# Patient Record
Sex: MALE | Race: WHITE | NOT HISPANIC OR LATINO | Employment: UNEMPLOYED | URBAN - METROPOLITAN AREA
[De-identification: names, ages, dates, MRNs, and addresses within clinical notes are randomized per-mention and may not be internally consistent; named-entity substitution may affect disease eponyms.]

---

## 2018-11-19 ENCOUNTER — HOSPITAL ENCOUNTER (EMERGENCY)
Facility: HOSPITAL | Age: 23
Discharge: HOME | End: 2018-11-19
Attending: EMERGENCY MEDICINE

## 2018-11-19 VITALS
RESPIRATION RATE: 18 BRPM | HEIGHT: 70 IN | TEMPERATURE: 98 F | HEART RATE: 90 BPM | OXYGEN SATURATION: 98 % | SYSTOLIC BLOOD PRESSURE: 148 MMHG | DIASTOLIC BLOOD PRESSURE: 74 MMHG | WEIGHT: 225.5 LBS | BODY MASS INDEX: 32.28 KG/M2

## 2018-11-19 DIAGNOSIS — S06.0X0A CONCUSSION WITHOUT LOSS OF CONSCIOUSNESS, INITIAL ENCOUNTER: Primary | ICD-10-CM

## 2018-11-19 DIAGNOSIS — G44.319 ACUTE POST-TRAUMATIC HEADACHE, NOT INTRACTABLE: ICD-10-CM

## 2018-11-19 PROCEDURE — 99284 EMERGENCY DEPT VISIT MOD MDM: CPT

## 2018-11-19 PROCEDURE — 25000003 PHARM REV CODE 250: Performed by: PHYSICIAN ASSISTANT

## 2018-11-19 RX ORDER — ACETAMINOPHEN 325 MG/1
650 TABLET ORAL
Status: COMPLETED | OUTPATIENT
Start: 2018-11-19 | End: 2018-11-19

## 2018-11-19 RX ADMIN — ACETAMINOPHEN 650 MG: 325 TABLET ORAL at 09:11

## 2018-11-20 NOTE — ED PROVIDER NOTES
"SCRIBE #1 NOTE: I, Echo Gillette, am scribing for, and in the presence of, Andrés Mendoza PA-C. I have scribed the entire note.       History     Chief Complaint   Patient presents with    Head Injury     stood up fast and hit head on cabinet; no lac; denies LOC; reports "head feels heavy"; +nausea, no vomiting; aaox4, gcs15     Review of patient's allergies indicates:  No Known Allergies      History of Present Illness     HPI    11/19/2018, 8:50 PM  History obtained from the patient      History of Present Illness: Broderick Richmond is a 22 y.o. male patient who presents to the Emergency Department for evaluation of a head injury which occurred about 8 hours PTA while at work. Pt was sent by a provider at [facility] for further evaluation with CT scan to r/o head injury. Pt states he was bent over and stood up fast when he hit his head on laundry room cabinet. He states he fell to his knees and felt "dazed" after. He states he has not been able to focus as well since the incident, has been very nauseated, and has had a dull headache. Symptoms are constant and moderate in severity. No mitigating or exacerbating factors reported. Associated sxs include nausea, decreased concentration, intermittent "throbbing" head pressure. Patient denies any LOC, neck pain, dizziness, weakness, palpitations, CP, SOB and all other sxs at this time. He drove himself to urgent care, was evaluated, then told to "go to an ER for a CT". No further complaints or concerns at this time.       Arrival mode: Personal vehicle     PCP: Primary Doctor No        Past Medical History:  Past Medical History:   Diagnosis Date    Ankle fracture, left     Tibia fracture     Right        Past Surgical History:  History reviewed. No pertinent surgical history.      Family History:  History reviewed. No pertinent information.     Social History:  Social History     Tobacco Use    Smoking status: Never Smoker    Smokeless tobacco: Current " "User   Substance and Sexual Activity    Alcohol use: Yes    Drug use: No    Sexual activity: Unknown         Review of Systems     Review of Systems   Constitutional: Negative for chills, diaphoresis and fever.   HENT: Negative for facial swelling.         (+) CHI   (+) "throbbing" head pressure    Eyes: Negative for pain and visual disturbance.   Respiratory: Negative for shortness of breath.    Cardiovascular: Negative for chest pain.   Gastrointestinal: Positive for nausea. Negative for abdominal pain and vomiting.   Musculoskeletal: Negative for back pain, gait problem and neck pain.   Skin: Negative for wound.   Neurological: Positive for headaches. Negative for dizziness, tremors, seizures, syncope (LOC), facial asymmetry, speech difficulty, weakness, light-headedness and numbness.   Psychiatric/Behavioral: Positive for decreased concentration. Negative for confusion.   All other systems reviewed and are negative.       Physical Exam     Initial Vitals [11/19/18 1930]   BP Pulse Resp Temp SpO2   (!) 152/71 93 18 98.6 °F (37 °C) 100 %      MAP       --          Physical Exam  Nursing Notes and Vital Signs Reviewed.  Constitutional: Patient is in mild distress. Well-developed and well-nourished.  Head: Normocephalic. Mild tenderness to superior scalp. No hematoma or laceration. No Marino's sign.   Eyes: PERRL. EOM intact. Sclera white. No nystagmus. No racoon eyes.   ENT: Mucous membranes are moist. No hemotympanum.   Neck: Supple. Full ROM. Non-tender.   Cardiovascular: Regular rate.   Pulmonary/Chest: No respiratory distress.   Abdominal: Non-distended.   Musculoskeletal: Moves all extremities. No obvious deformities.   Skin: Warm and dry.  Neurological: Patient is alert and oriented to person, place and time. Pupils ERRL and EOM normal. Normal gait. GCS 15. There is no pronator drift of outstretched arms. Normal FTN. Speech is clear and normal. No dysarthria. No acute focal neurological deficits noted. AAO " "x 3. 5/5 strength extremities x 4.   Psychiatric: Normal affect. Good eye contact. Appropriate in content.         ED Course   Procedures  ED Vital Signs:  Vitals:    11/19/18 1930   BP: (!) 152/71   Pulse: 93   Resp: 18   Temp: 98.6 °F (37 °C)   TempSrc: Oral   SpO2: 100%   Weight: 102.3 kg (225 lb 8.5 oz)   Height: 5' 10" (1.778 m)       Abnormal Lab Results:  Labs Reviewed - No data to display     All Lab Results:  None ordered.     Imaging Results:    Imaging Results          CT Head Without Contrast (Final result)  Result time 11/19/18 21:29:08    Final result by South Riddle III, MD (11/19/18 21:29:08)                 Impression:      Normal non contrast CT scan of the brain.    All CT scans at this facility are performed  using dose modulation techniques as appropriate to performed exam including the following:  automated exposure control; adjustment of mA and/or kV according to the patients size (this includes techniques or standardized protocols for targeted exams where dose is matched to indication/reason for exam: i.e. extremities or head);  iterative reconstruction technique.      Electronically signed by: South Riddle MD  Date:    11/19/2018  Time:    21:29             Narrative:    EXAMINATION:  CT HEAD WITHOUT CONTRAST    CLINICAL HISTORY:  Headache, acute, norm neuro exam;    TECHNIQUE:  Standard non contrast CT scan of the brain.    COMPARISON:  None    FINDINGS:  The brain and ventricles are of normal appearance.  No hemorrhage, mass lesion, or hydrocephalus.   No depressed skull fracture.                                    The EKG was ordered, reviewed, and independently interpreted by the ED provider.  None ordered.          The Emergency Provider reviewed the vital signs and test results, which are outlined above.     ED Discussion     9:39 PM: Reassessed pt at this time. Discussed with pt all pertinent ED information and results. Discussed pt dx and plan of tx. Gave pt all f/u and " return to the ED instructions. All questions and concerns were addressed at this time. Pt expresses understanding of information and instructions, and is comfortable with plan to discharge. Pt is stable for discharge.    Closed Head Injury precautions were discussed with patient and/or family/caretaker; specifically that despite unrevealing CT scan or exam, a concussion can represent brain tissue injury.  Second impact syndrome was also discussed.    Pre-hypertension/Hypertension: The pt has been informed that they may have pre-hypertension or hypertension based on a blood pressure reading in the ED. I recommend that the pt call the PCP listed on their discharge instructions or a physician of their choice this week to arrange f/u for further evaluation of possible pre-hypertension or hypertension.       ED Medication(s):  Medications   acetaminophen tablet 650 mg (650 mg Oral Given 11/19/18 2112)     There are no discharge medications for this patient.        Follow-up Information     [facility]    Specialty:  Emergency Medicine  Why:  If symptoms worsen in any way. Follow up with primary care in the next 1-2 days for re-evaluation and further management. Take Tylenol as directed as needed for pain.  Contact information:  [address]  [facility] [address] [phone]                 Medical Decision Making:   History:   Old Records Summarized: records from another hospital.       <> Summary of Records: Urgent care discharge instructions to patient stating to go to ER   Initial Assessment:   Pt with post-traumatic headache, decreased focus, and nausea feeling dazed since hitting head earlier today. Sent from urgent care to the ER for a head CT to r/o head injury.   Differential Diagnosis:   Scalp contusion, Concussion, Post-traumatic headache, ICH, TBI, etc   Clinical Tests:   Radiological Study: Ordered and Reviewed  ED Management:  CT negative   Head injury precautions  provided   Advised close follow up with PCP   Advised Tylenol as directed as needed for pain  Advised prompt return to the ER if worse in any way.              Scribe Attestation:   Scribe #1: I performed the above scribed service and the documentation accurately describes the services I performed. I attest to the accuracy of the note.     Attending:   Physician Attestation Statement for Scribe #1: I, Andrés Mendoza PA-C, personally performed the services described in this documentation, as scribed by Echo Gillette, in my presence, and it is both accurate and complete.           Clinical Impression       ICD-10-CM ICD-9-CM   1. Concussion without loss of consciousness, initial encounter S06.0X0A 850.0   2. Acute post-traumatic headache, not intractable G44.319 339.21       Disposition:   Disposition: Discharged  Condition: Stable         Andrés Mendoza PA-C  11/19/18 2146

## 2020-03-21 ENCOUNTER — HOSPITAL ENCOUNTER (EMERGENCY)
Facility: HOSPITAL | Age: 25
Discharge: HOME | End: 2020-03-21
Attending: EMERGENCY MEDICINE
Payer: COMMERCIAL

## 2020-03-21 VITALS
OXYGEN SATURATION: 98 % | DIASTOLIC BLOOD PRESSURE: 74 MMHG | TEMPERATURE: 99 F | HEART RATE: 79 BPM | RESPIRATION RATE: 22 BRPM | BODY MASS INDEX: 30.78 KG/M2 | HEIGHT: 70 IN | WEIGHT: 215 LBS | SYSTOLIC BLOOD PRESSURE: 148 MMHG

## 2020-03-21 DIAGNOSIS — R03.0 ELEVATED BLOOD PRESSURE READING: ICD-10-CM

## 2020-03-21 DIAGNOSIS — R06.02 SHORTNESS OF BREATH: ICD-10-CM

## 2020-03-21 DIAGNOSIS — R68.89 FLU-LIKE SYMPTOMS: Primary | ICD-10-CM

## 2020-03-21 LAB
INFLUENZA A, MOLECULAR: NEGATIVE
INFLUENZA B, MOLECULAR: NEGATIVE
SPECIMEN SOURCE: NORMAL

## 2020-03-21 PROCEDURE — 87502 INFLUENZA DNA AMP PROBE: CPT

## 2020-03-21 PROCEDURE — U0002 COVID-19 LAB TEST NON-CDC: HCPCS

## 2020-03-21 PROCEDURE — 99283 EMERGENCY DEPT VISIT LOW MDM: CPT | Mod: 25

## 2020-03-21 RX ORDER — LORATADINE 10 MG/1
10 TABLET ORAL
COMMUNITY

## 2020-03-21 NOTE — DISCHARGE INSTRUCTIONS
Use over the counter:  Afrin nasal spray OTC as directed.    Do not use more than 3 days in a row.    Use over the counter:   Nasal saline.  Use this as needed for dryness or congestion.     Get plenty of rest, keep well hydrated.    For throat pain, suck on lozenges or popsicles.    Return to the ED for:   Severe headache, confusion, difficulty breathing, rash, shortness of breath, fever or worsening condition.       Your blood pressure was elevated in the ED.  You may have high blood pressure.   Keep a log of your blood pressure and follow up with a Primary Care Provider within the next two weeks. Call [phone] for appointment.

## 2020-03-21 NOTE — ED PROVIDER NOTES
SCRIBE #1 NOTE: I, Stacy Ibrahim, am scribing for, and in the presence of, Gisela Diaz DO. I have scribed the entire note.     We are currently under a state of national emergency which has been declared for the COVID-19 pandemic.        History     Chief Complaint   Patient presents with    General Illness     c/o having body aches, congestion in chest with heaviness and difficulty in taking a deep breath, dry cough x2-3 days, no fever.     Review of patient's allergies indicates:  No Known Allergies      History of Present Illness     HPI    3/21/2020, 2:52 PM  History obtained from the patient      History of Present Illness: Broderick Richmond is a 24 y.o. male patient who presents to the Emergency Department for evaluation of general illness which onset gradually 2 days PTA. Pt c/o fatigue, non-productive cough, SOB and chest tightness. No known COVID-19 interaction. Symptoms are constant and moderate in severity. No mitigating or exacerbating factors reported. Patient denies any n/v/d, rhinorrhea, sore throat, dysuria, HA and all other sxs at this time. No prior Tx reported. No further complaints or concerns at this time.       Arrival mode: Personal vehicle      PCP: Primary Doctor No        Past Medical History:  Past Medical History:   Diagnosis Date    Ankle fracture, left     Tibia fracture     Right        Past Surgical History:  History reviewed. No pertinent surgical history.      Family History:  History reviewed. No pertinent family history.    Social History:  Social History     Tobacco Use    Smoking status: Never Smoker    Smokeless tobacco: Current User   Substance and Sexual Activity    Alcohol use: Yes     Comment: Occasional    Drug use: No    Sexual activity: unknown        Review of Systems     Review of Systems   Constitutional: Positive for fatigue. Negative for fever.   HENT: Negative for rhinorrhea and sore throat.    Respiratory: Positive for cough (non-productive),  "chest tightness and shortness of breath.    Cardiovascular: Negative for chest pain.   Gastrointestinal: Negative for diarrhea, nausea and vomiting.   Genitourinary: Negative for dysuria.   Musculoskeletal: Negative for back pain.   Skin: Negative for rash.   Neurological: Negative for weakness and headaches.   Hematological: Does not bruise/bleed easily.   All other systems reviewed and are negative.     Physical Exam     Initial Vitals [03/21/20 1442]   BP Pulse Resp Temp SpO2   (!) 153/73 81 20 98.4 °F (36.9 °C) 98 %      MAP       --          Physical Exam  Nursing Notes and Vital Signs Reviewed.  Constitutional: Patient is in no acute distress. Well-developed and well-nourished.  Head: Atraumatic. Normocephalic.  Eyes: PERRL. EOM intact. Conjunctivae are not pale. No scleral icterus.  ENT: Mucous membranes are moist. Oropharynx is clear and symmetric.    Neck: Supple. Full ROM. No lymphadenopathy.  Cardiovascular: Regular rate. Regular rhythm. No murmurs, rubs, or gallops. Distal pulses are 2+ and symmetric.  Pulmonary/Chest: No respiratory distress. Clear to auscultation bilaterally. No wheezing or rales.  Abdominal: Soft and non-distended.  There is no tenderness.  No rebound, guarding, or rigidity. Good bowel sounds.  Genitourinary: No CVA tenderness  Musculoskeletal: Moves all extremities. No obvious deformities. No edema. No calf tenderness.  Skin: Warm and dry.  Neurological:  Alert, awake, and appropriate.  Normal speech.  No acute focal neurological deficits are appreciated.  Psychiatric: Normal affect. Good eye contact. Appropriate in content.     ED Course   Procedures  ED Vital Signs:  Vitals:    03/21/20 1442 03/21/20 1600   BP: (!) 153/73 (!) 148/74   Pulse: 81 79   Resp: 20 (!) 22   Temp: 98.4 °F (36.9 °C) 98.6 °F (37 °C)   TempSrc: Oral Oral   SpO2: 98% 98%   Weight: 97.5 kg (215 lb)    Height: 5' 10" (1.778 m)        Abnormal Lab Results:  Labs Reviewed   INFLUENZA A & B BY MOLECULAR "   SARS-COV-2 (COVID-19) QUALITATIVE PCR        All Lab Results:  Results for orders placed or performed during the hospital encounter of 03/21/20   Influenza A & B by Molecular   Result Value Ref Range    Influenza A, Molecular Negative Negative    Influenza B, Molecular Negative Negative    Flu A & B Source NP          Imaging Results:  Imaging Results          X-Ray Chest 1 View (Final result)  Result time 03/21/20 15:59:03    Final result by Bronson Durbin MD (03/21/20 15:59:03)                 Impression:      No acute abnormality.      Electronically signed by: Bronson Durbin  Date:    03/21/2020  Time:    15:59             Narrative:    EXAMINATION:  XR CHEST 1 VIEW    CLINICAL HISTORY:  . Shortness of breath    TECHNIQUE:  Single frontal portable view of the chest was performed.    COMPARISON:  None    FINDINGS:  Support devices: None    The lungs are clear, with normal appearance of pulmonary vasculature and no pleural effusion or pneumothorax.    The cardiac silhouette is normal in size. The hilar and mediastinal contours are unremarkable.    Bones are intact.                                            The Emergency Provider reviewed the vital signs and test results, which are outlined above.     ED Discussion       3:46 PM: Reassessed pt at this time.  Pt states his condition has improved at this time. Discussed with pt all pertinent ED information and results. Discussed pt dx and plan of tx. Gave pt all f/u and return to the ED instructions. All questions and concerns were addressed at this time. Pt expresses understanding of information and instructions, and is comfortable with plan to discharge. Pt is stable for discharge.    I discussed with patient and/or family/caretaker that evaluation in the ED does not suggest any emergent or life threatening medical conditions requiring immediate intervention beyond what was provided in the ED, and I believe patient is safe for discharge.  Regardless, an  unremarkable evaluation in the ED does not preclude the development or presence of a serious of life threatening condition. As such, patient was instructed to return immediately for any worsening or change in current symptoms.    I discussed with patient and/or family/caretaker that negative X-ray does not rule out occult fracture or other soft tissue injury.  Persistent pain greater than 7-10 days or increased pain requires follow up, specifically with orthopedics.          Medical Decision Making:   Clinical Tests:   Lab Tests: Ordered and Reviewed  Radiological Study: Ordered and Reviewed           ED Medication(s):  Medications - No data to display    Discharge Medication List as of 3/21/2020  3:46 PM          Follow-up Information     [facility] In 2 days.    Why:  Return to the ED for:  Shortness of breath, fever, chest pain or worsening condition.  Contact information:  [address] [facility] [address] [phone]                       Scribe Attestation:   Scribe #1: I performed the above scribed service and the documentation accurately describes the services I performed. I attest to the accuracy of the note.     Attending:   Physician Attestation Statement for Scribe #1: I, Gisela Diaz DO, personally performed the services described in this documentation, as scribed by Stacy Ibrahim, in my presence, and it is both accurate and complete.           Clinical Impression       ICD-10-CM ICD-9-CM   1. Flu-like symptoms R68.89 780.99   2. Shortness of breath R06.02 786.05   3. Elevated blood pressure reading R03.0 796.2       Disposition:   Disposition: Discharged  Condition: Stable         Gisela Diaz DO  03/21/20 1650

## 2020-03-23 LAB — SARS-COV-2 RNA RESP QL NAA+PROBE: NOT DETECTED

## 2020-05-14 ENCOUNTER — HOSPITAL ENCOUNTER (EMERGENCY)
Facility: HOSPITAL | Age: 25
Discharge: HOME | End: 2020-05-14
Attending: EMERGENCY MEDICINE
Payer: COMMERCIAL

## 2020-05-14 VITALS
OXYGEN SATURATION: 99 % | SYSTOLIC BLOOD PRESSURE: 147 MMHG | DIASTOLIC BLOOD PRESSURE: 94 MMHG | WEIGHT: 235.88 LBS | HEART RATE: 70 BPM | BODY MASS INDEX: 33.85 KG/M2 | RESPIRATION RATE: 18 BRPM | TEMPERATURE: 99 F

## 2020-05-14 DIAGNOSIS — T17.0XXA: Primary | ICD-10-CM

## 2020-05-14 PROCEDURE — 99283 EMERGENCY DEPT VISIT LOW MDM: CPT

## 2020-05-14 RX ORDER — SULFAMETHOXAZOLE AND TRIMETHOPRIM 800; 160 MG/1; MG/1
1 TABLET ORAL 2 TIMES DAILY
Qty: 20 TABLET | Refills: 0 | Status: SHIPPED | OUTPATIENT
Start: 2020-05-14 | End: 2020-05-24

## 2020-05-14 NOTE — ED PROVIDER NOTES
Encounter Date: 5/14/2020       History     Chief Complaint   Patient presents with    food in nose     pt states he coughed last night and now has food lodged in nose     24 year old male with complaint of foreign body sensation in left sinus region after eating taco last night and experiencing a coughing spell while swallowing.  Reports that he felt foreign body enter nasal cavity and now feels mild foreign body sensation in left nasal cavity.  Pt denies pain.  Denies fever or chills. Reports similar event in past but was able to expectorate food particles in past.  No difficulty breathing. No other complaints.         Review of patient's allergies indicates:  No Known Allergies  Past Medical History:   Diagnosis Date    Ankle fracture, left     Tibia fracture     Right      History reviewed. No pertinent surgical history.  History reviewed. No pertinent family history.  Social History     Tobacco Use    Smoking status: Never Smoker    Smokeless tobacco: Current User   Substance Use Topics    Alcohol use: Yes     Comment: Occasional    Drug use: No     Review of Systems   Constitutional: Negative for fever.   HENT: Negative for sore throat.    Respiratory: Negative for shortness of breath.    Cardiovascular: Negative for chest pain.   Gastrointestinal: Negative for nausea.   Genitourinary: Negative for dysuria.   Musculoskeletal: Negative for back pain.   Skin: Negative for rash.   Neurological: Negative for weakness.   Hematological: Does not bruise/bleed easily.       Physical Exam     Initial Vitals [05/14/20 0845]   BP Pulse Resp Temp SpO2   (!) 147/94 70 18 98.7 °F (37.1 °C) 99 %      MAP       --         Physical Exam    Nursing note and vitals reviewed.  Constitutional: He appears well-developed and well-nourished.   HENT:   Head: Normocephalic and atraumatic.   Nasal passage ways clear, no foreign bodies, no blood, no facial tenderness or swelling   Eyes: Conjunctivae are normal. Pupils are equal,  round, and reactive to light.   Neck: Normal range of motion. Neck supple.   Cardiovascular: Normal rate, regular rhythm, normal heart sounds and intact distal pulses.   Pulmonary/Chest: Breath sounds normal.   Abdominal: Soft. There is no rebound and no guarding.   Musculoskeletal: Normal range of motion.   Neurological: He is alert.   Skin: Skin is warm and dry.   Psychiatric: He has a normal mood and affect. His behavior is normal. Thought content normal.         ED Course   Procedures  Labs Reviewed   HIV 1 / 2 ANTIBODY          Imaging Results    None          Medical Decision Making:   ENT not on call.  No urgent need for ENT.  Will place on antibiotics and have pt follow up with ENT                                 Clinical Impression:       ICD-10-CM ICD-9-CM   1. Nasal sinus foreign body, initial encounter T17.0XXA 932     E915             ED Disposition Condition    Discharge Stable        ED Prescriptions     Medication Sig Dispense Start Date End Date Auth. Provider    sulfamethoxazole-trimethoprim 800-160mg (BACTRIM DS) 800-160 mg Tab Take 1 tablet by mouth 2 (two) times daily. for 10 days 20 tablet 5/14/2020 5/24/2020 Holden Suarez NP        Follow-up Information     Follow up With Specialties Details Why Contact Info    [facility]  Schedule an appointment as soon as possible for a visit in 2 days  [phone]                                     Holden Suarez NP  05/14/20 0906       Holden Suarez NP  05/14/20 0906

## 2020-12-10 ENCOUNTER — OFFICE VISIT (OUTPATIENT)
Dept: URGENT CARE | Facility: CLINIC | Age: 25
End: 2020-12-10
Payer: COMMERCIAL

## 2020-12-10 VITALS
WEIGHT: 249.69 LBS | TEMPERATURE: 98 F | BODY MASS INDEX: 35.82 KG/M2 | HEART RATE: 91 BPM | DIASTOLIC BLOOD PRESSURE: 74 MMHG | SYSTOLIC BLOOD PRESSURE: 129 MMHG | OXYGEN SATURATION: 98 %

## 2020-12-10 DIAGNOSIS — Z20.822 EXPOSURE TO COVID-19 VIRUS: ICD-10-CM

## 2020-12-10 DIAGNOSIS — R52 BODY ACHES: Primary | ICD-10-CM

## 2020-12-10 PROCEDURE — 99203 OFFICE O/P NEW LOW 30 MIN: CPT | Mod: S$GLB,,, | Performed by: PHYSICIAN ASSISTANT

## 2020-12-10 PROCEDURE — 3008F PR BODY MASS INDEX (BMI) DOCUMENTED: ICD-10-PCS | Mod: CPTII,S$GLB,, | Performed by: PHYSICIAN ASSISTANT

## 2020-12-10 PROCEDURE — 3008F BODY MASS INDEX DOCD: CPT | Mod: CPTII,S$GLB,, | Performed by: PHYSICIAN ASSISTANT

## 2020-12-10 PROCEDURE — 99999 PR PBB SHADOW E&M-EST. PATIENT-LVL III: CPT | Mod: PBBFAC,,, | Performed by: PHYSICIAN ASSISTANT

## 2020-12-10 PROCEDURE — 99203 PR OFFICE/OUTPT VISIT, NEW, LEVL III, 30-44 MIN: ICD-10-PCS | Mod: S$GLB,,, | Performed by: PHYSICIAN ASSISTANT

## 2020-12-10 PROCEDURE — 99999 PR PBB SHADOW E&M-EST. PATIENT-LVL III: ICD-10-PCS | Mod: PBBFAC,,, | Performed by: PHYSICIAN ASSISTANT

## 2020-12-10 RX ORDER — NAPROXEN SODIUM 220 MG
220 TABLET ORAL
COMMUNITY
End: 2022-01-30 | Stop reason: ALTCHOICE

## 2020-12-10 RX ORDER — INDOMETHACIN 75 MG/1
75 CAPSULE, EXTENDED RELEASE ORAL
COMMUNITY
Start: 2020-12-09 | End: 2021-01-08

## 2020-12-10 RX ORDER — OMEPRAZOLE 20 MG/1
20 TABLET, DELAYED RELEASE ORAL DAILY
COMMUNITY
End: 2022-01-30 | Stop reason: CLARIF

## 2020-12-10 NOTE — PROGRESS NOTES
Broderick Richmond is a 25 y.o. male who presents after close exposure to COVID 19.  They are presenting with symptoms of fatigue, body aches, cold like symptoms for 2 days  Denies fever, cough, SOB, anosmia, or N/V/D.  Desires testing today.    All areas of patients chart reviewed including past medical history, past surgical history, medications, allergies, family history, and social history.    Review of Systems   Constitutional: Positive for chills and malaise/fatigue. Negative for fever.   HENT: Negative for sore throat.    Respiratory: Negative for cough and shortness of breath.    Cardiovascular: Negative for chest pain.   Gastrointestinal: Negative for abdominal pain and nausea.   Genitourinary: Negative for dysuria and frequency.   Musculoskeletal: Positive for myalgias. Negative for back pain.   Neurological: Negative for headaches.   All other systems reviewed and are negative.      Physical Exam:  /74   Pulse 91   Temp 98.2 °F (36.8 °C)   Wt 113.3 kg (249 lb 10.7 oz)   SpO2 98%   BMI 35.82 kg/m²     Physical Exam   Constitutional: He is oriented to person, place, and time and well-developed, well-nourished, and in no distress.   HENT:   Head: Normocephalic.   Right Ear: External ear normal.   Left Ear: External ear normal.   Mouth/Throat: No oropharyngeal exudate.   Neck: Normal range of motion.   Cardiovascular: Normal rate and normal heart sounds.   Pulmonary/Chest: Effort normal and breath sounds normal. No respiratory distress.   Neurological: He is alert and oriented to person, place, and time.   Skin: Skin is warm.   Psychiatric: Affect normal.   Vitals reviewed.      Assessment/Plan:  1. Body aches  - POCT COVID-19 Rapid Screening    2. Exposure to COVID-19 virus  - POCT COVID-19 Rapid Screening      Rapid COVID test today is negative.    You have tested negative for COVID-19 today.  If you did not have any close exposure as defined below, then effective today, you can return to your  normal daily activities including social distancing, wearing masks, and frequent handwashing.    A close exposure is defined as anyone who had a masked or an unmasked exposure to a known COVID -19 positive person, at less than 6 ft for more than 15 minutes.  If your exposure meets this definition, then you are required to quarantine for 14 days per the CDC.    The 14 day quarantine begins from the day you were exposed, not the day of your test.  For example, if your exposure was on a Monday, and you waited until Friday of the same week to get tested and it was negative, your 14 day quarantine begins from that Monday, not the Friday you tested negative.    If you developed symptoms since the exposure, and your test was negative today, you still have to quarantine for 14 days from the date of the exposure.    So if you meet the definition of a close exposure, A NEGATIVE TEST DOES NOT GET YOU OUT OF 14 DAYS OF QUARANTINE!

## 2020-12-14 ENCOUNTER — OFFICE VISIT (OUTPATIENT)
Dept: URGENT CARE | Facility: CLINIC | Age: 25
End: 2020-12-14
Payer: COMMERCIAL

## 2020-12-14 VITALS
HEART RATE: 82 BPM | OXYGEN SATURATION: 98 % | SYSTOLIC BLOOD PRESSURE: 140 MMHG | WEIGHT: 247 LBS | DIASTOLIC BLOOD PRESSURE: 84 MMHG | TEMPERATURE: 97 F | BODY MASS INDEX: 35.44 KG/M2

## 2020-12-14 DIAGNOSIS — R52 BODY ACHES: ICD-10-CM

## 2020-12-14 DIAGNOSIS — R05.9 COUGH: ICD-10-CM

## 2020-12-14 DIAGNOSIS — Z20.822 EXPOSURE TO COVID-19 VIRUS: Primary | ICD-10-CM

## 2020-12-14 LAB
CTP QC/QA: YES
CTP QC/QA: YES
POC MOLECULAR INFLUENZA A AGN: NEGATIVE
POC MOLECULAR INFLUENZA B AGN: NEGATIVE
SARS-COV-2 RDRP RESP QL NAA+PROBE: NEGATIVE

## 2020-12-14 PROCEDURE — 99214 OFFICE O/P EST MOD 30 MIN: CPT | Mod: S$GLB,CS,, | Performed by: PHYSICIAN ASSISTANT

## 2020-12-14 PROCEDURE — 99214 PR OFFICE/OUTPT VISIT, EST, LEVL IV, 30-39 MIN: ICD-10-PCS | Mod: S$GLB,CS,, | Performed by: PHYSICIAN ASSISTANT

## 2020-12-14 PROCEDURE — 3008F PR BODY MASS INDEX (BMI) DOCUMENTED: ICD-10-PCS | Mod: CPTII,S$GLB,, | Performed by: PHYSICIAN ASSISTANT

## 2020-12-14 PROCEDURE — 87502 INFLUENZA DNA AMP PROBE: CPT | Mod: QW,S$GLB,, | Performed by: PHYSICIAN ASSISTANT

## 2020-12-14 PROCEDURE — 99999 PR PBB SHADOW E&M-EST. PATIENT-LVL III: ICD-10-PCS | Mod: PBBFAC,,, | Performed by: PHYSICIAN ASSISTANT

## 2020-12-14 PROCEDURE — 99999 PR PBB SHADOW E&M-EST. PATIENT-LVL III: CPT | Mod: PBBFAC,,, | Performed by: PHYSICIAN ASSISTANT

## 2020-12-14 PROCEDURE — U0002: ICD-10-PCS | Mod: QW,S$GLB,, | Performed by: PHYSICIAN ASSISTANT

## 2020-12-14 PROCEDURE — 1126F PR PAIN SEVERITY QUANTIFIED, NO PAIN PRESENT: ICD-10-PCS | Mod: S$GLB,,, | Performed by: PHYSICIAN ASSISTANT

## 2020-12-14 PROCEDURE — U0002 COVID-19 LAB TEST NON-CDC: HCPCS | Mod: QW,S$GLB,, | Performed by: PHYSICIAN ASSISTANT

## 2020-12-14 PROCEDURE — 3008F BODY MASS INDEX DOCD: CPT | Mod: CPTII,S$GLB,, | Performed by: PHYSICIAN ASSISTANT

## 2020-12-14 PROCEDURE — 1126F AMNT PAIN NOTED NONE PRSNT: CPT | Mod: S$GLB,,, | Performed by: PHYSICIAN ASSISTANT

## 2020-12-14 PROCEDURE — 87502 POCT INFLUENZA A/B MOLECULAR: ICD-10-PCS | Mod: QW,S$GLB,, | Performed by: PHYSICIAN ASSISTANT

## 2020-12-14 RX ORDER — CETIRIZINE HYDROCHLORIDE 10 MG/1
CAPSULE, LIQUID FILLED ORAL
COMMUNITY

## 2020-12-14 NOTE — PATIENT INSTRUCTIONS
Rapid COVID test today is negative.  Influenza test is negative.    It appears to be a viral like illness.  Recommend continue treating symptoms conservatively.  VSS, afebrile in clinic.  Follow up with primary care provider in 1 week.

## 2020-12-14 NOTE — PROGRESS NOTES
Broderick Richmond is a 25 y.o. male who presents after close exposure to COVID 19.  They are presenting with symptoms of body aches, fatigue, diarrhea, and sinus pressure that has worsened.  He states he has noticed a loss of appetite and possible anosmia.  His symptoms have been ongoing for about 1 week.  He denies fevers, night sweats, or chills.  He was tested for COVID 1 week ago and it was negative but he presents today with new symptoms.    All areas of patients chart reviewed including past medical history, past surgical history, medications, allergies, family history, and social history.    Review of Systems   Constitutional: Positive for malaise/fatigue. Negative for fever.   HENT: Positive for sinus pain. Negative for sore throat.    Respiratory: Positive for cough. Negative for shortness of breath.    Cardiovascular: Negative for chest pain.   Gastrointestinal: Positive for diarrhea. Negative for abdominal pain and nausea.   Genitourinary: Negative for dysuria and frequency.   Musculoskeletal: Positive for myalgias. Negative for back pain.   Neurological: Negative for headaches.   All other systems reviewed and are negative.      Physical Exam:  BP (!) 140/84   Pulse 82   Temp 97 °F (36.1 °C) (Temporal)   Wt 112.1 kg (247 lb 0.4 oz)   SpO2 98%   BMI 35.44 kg/m²     Physical Exam   Constitutional: He is oriented to person, place, and time and well-developed, well-nourished, and in no distress.   HENT:   Head: Normocephalic.   Right Ear: Hearing, tympanic membrane, external ear and ear canal normal.   Left Ear: Hearing, tympanic membrane, external ear and ear canal normal.   Nose: Nose normal. Right sinus exhibits no maxillary sinus tenderness and no frontal sinus tenderness. Left sinus exhibits no maxillary sinus tenderness and no frontal sinus tenderness.   Mouth/Throat: Uvula is midline, oropharynx is clear and moist and mucous membranes are normal. No oropharyngeal exudate.   Neck: Normal range of  motion.   Cardiovascular: Normal rate and normal heart sounds.   Pulmonary/Chest: Effort normal and breath sounds normal. No respiratory distress.   Neurological: He is alert and oriented to person, place, and time.   Skin: Skin is warm.   Psychiatric: Affect normal.   Vitals reviewed.      Assessment/Plan:  1. Exposure to COVID-19 virus  - POCT COVID-19 Rapid Screening    2. Cough  - POCT COVID-19 Rapid Screening  - POCT Influenza A/B Molecular    3. Body aches  - POCT COVID-19 Rapid Screening  - POCT Influenza A/B Molecular      Rapid COVID test today is negative.  Influenza test is negative.    It appears to be a viral like illness.  Recommend continue treating symptoms conservatively.  VSS, afebrile in clinic.  Follow up with primary care provider in 1 week.

## 2021-04-08 ENCOUNTER — OFFICE VISIT (OUTPATIENT)
Dept: URGENT CARE | Facility: CLINIC | Age: 26
End: 2021-04-08
Payer: COMMERCIAL

## 2021-04-08 VITALS
BODY MASS INDEX: 37.15 KG/M2 | HEART RATE: 87 BPM | SYSTOLIC BLOOD PRESSURE: 121 MMHG | TEMPERATURE: 99 F | WEIGHT: 258.94 LBS | OXYGEN SATURATION: 98 % | DIASTOLIC BLOOD PRESSURE: 80 MMHG

## 2021-04-08 DIAGNOSIS — R11.2 NAUSEA AND VOMITING IN ADULT: Primary | ICD-10-CM

## 2021-04-08 DIAGNOSIS — R19.7 DIARRHEA, UNSPECIFIED TYPE: ICD-10-CM

## 2021-04-08 DIAGNOSIS — R50.9 FEVER WITH CHILLS: ICD-10-CM

## 2021-04-08 PROCEDURE — 3008F PR BODY MASS INDEX (BMI) DOCUMENTED: ICD-10-PCS | Mod: CPTII,S$GLB,, | Performed by: NURSE PRACTITIONER

## 2021-04-08 PROCEDURE — 1126F AMNT PAIN NOTED NONE PRSNT: CPT | Mod: S$GLB,,, | Performed by: NURSE PRACTITIONER

## 2021-04-08 PROCEDURE — 99214 OFFICE O/P EST MOD 30 MIN: CPT | Mod: S$GLB,,, | Performed by: NURSE PRACTITIONER

## 2021-04-08 PROCEDURE — 3008F BODY MASS INDEX DOCD: CPT | Mod: CPTII,S$GLB,, | Performed by: NURSE PRACTITIONER

## 2021-04-08 PROCEDURE — 99999 PR PBB SHADOW E&M-EST. PATIENT-LVL III: CPT | Mod: PBBFAC,,, | Performed by: NURSE PRACTITIONER

## 2021-04-08 PROCEDURE — 99999 PR PBB SHADOW E&M-EST. PATIENT-LVL III: ICD-10-PCS | Mod: PBBFAC,,, | Performed by: NURSE PRACTITIONER

## 2021-04-08 PROCEDURE — 1126F PR PAIN SEVERITY QUANTIFIED, NO PAIN PRESENT: ICD-10-PCS | Mod: S$GLB,,, | Performed by: NURSE PRACTITIONER

## 2021-04-08 PROCEDURE — 99214 PR OFFICE/OUTPT VISIT, EST, LEVL IV, 30-39 MIN: ICD-10-PCS | Mod: S$GLB,,, | Performed by: NURSE PRACTITIONER

## 2021-04-08 RX ORDER — ONDANSETRON 4 MG/1
4 TABLET, ORALLY DISINTEGRATING ORAL EVERY 6 HOURS PRN
Qty: 10 TABLET | Refills: 0 | Status: SHIPPED | OUTPATIENT
Start: 2021-04-08

## 2021-04-28 ENCOUNTER — TELEPHONE (OUTPATIENT)
Dept: URGENT CARE | Facility: CLINIC | Age: 26
End: 2021-04-28

## 2021-04-28 ENCOUNTER — OFFICE VISIT (OUTPATIENT)
Dept: URGENT CARE | Facility: CLINIC | Age: 26
End: 2021-04-28
Payer: COMMERCIAL

## 2021-04-28 ENCOUNTER — LAB VISIT (OUTPATIENT)
Dept: LAB | Facility: HOSPITAL | Age: 26
End: 2021-04-28
Attending: PHYSICIAN ASSISTANT
Payer: COMMERCIAL

## 2021-04-28 VITALS
DIASTOLIC BLOOD PRESSURE: 78 MMHG | HEART RATE: 74 BPM | SYSTOLIC BLOOD PRESSURE: 114 MMHG | TEMPERATURE: 98 F | OXYGEN SATURATION: 98 % | BODY MASS INDEX: 36.69 KG/M2 | WEIGHT: 255.75 LBS

## 2021-04-28 DIAGNOSIS — R11.10 VOMITING, INTRACTABILITY OF VOMITING NOT SPECIFIED, PRESENCE OF NAUSEA NOT SPECIFIED, UNSPECIFIED VOMITING TYPE: ICD-10-CM

## 2021-04-28 DIAGNOSIS — R10.32 LEFT LOWER QUADRANT PAIN: Primary | ICD-10-CM

## 2021-04-28 DIAGNOSIS — R10.32 LEFT LOWER QUADRANT PAIN: ICD-10-CM

## 2021-04-28 LAB
ALBUMIN SERPL BCP-MCNC: 4.3 G/DL (ref 3.5–5.2)
ALP SERPL-CCNC: 56 U/L (ref 55–135)
ALT SERPL W/O P-5'-P-CCNC: 44 U/L (ref 10–44)
ANION GAP SERPL CALC-SCNC: 12 MMOL/L (ref 8–16)
AST SERPL-CCNC: 26 U/L (ref 10–40)
BASOPHILS # BLD AUTO: 0.04 K/UL (ref 0–0.2)
BASOPHILS NFR BLD: 0.6 % (ref 0–1.9)
BILIRUB SERPL-MCNC: 0.5 MG/DL (ref 0.1–1)
BILIRUB SERPL-MCNC: NORMAL MG/DL
BLOOD URINE, POC: NORMAL
BUN SERPL-MCNC: 16 MG/DL (ref 6–20)
CALCIUM SERPL-MCNC: 9.7 MG/DL (ref 8.7–10.5)
CHLORIDE SERPL-SCNC: 103 MMOL/L (ref 95–110)
CLARITY, POC UA: CLEAR
CO2 SERPL-SCNC: 24 MMOL/L (ref 23–29)
COLOR, POC UA: YELLOW
CREAT SERPL-MCNC: 1 MG/DL (ref 0.5–1.4)
DIFFERENTIAL METHOD: NORMAL
EOSINOPHIL # BLD AUTO: 0.2 K/UL (ref 0–0.5)
EOSINOPHIL NFR BLD: 2.4 % (ref 0–8)
ERYTHROCYTE [DISTWIDTH] IN BLOOD BY AUTOMATED COUNT: 12.2 % (ref 11.5–14.5)
EST. GFR  (AFRICAN AMERICAN): >60 ML/MIN/1.73 M^2
EST. GFR  (NON AFRICAN AMERICAN): >60 ML/MIN/1.73 M^2
GLUCOSE SERPL-MCNC: 95 MG/DL (ref 70–110)
GLUCOSE UR QL STRIP: NORMAL
HCT VFR BLD AUTO: 46 % (ref 40–54)
HGB BLD-MCNC: 15.3 G/DL (ref 14–18)
IMM GRANULOCYTES # BLD AUTO: 0.02 K/UL (ref 0–0.04)
IMM GRANULOCYTES NFR BLD AUTO: 0.3 % (ref 0–0.5)
KETONES UR QL STRIP: NORMAL
LEUKOCYTE ESTERASE URINE, POC: NORMAL
LIPASE SERPL-CCNC: 14 U/L (ref 4–60)
LYMPHOCYTES # BLD AUTO: 2.2 K/UL (ref 1–4.8)
LYMPHOCYTES NFR BLD: 33.8 % (ref 18–48)
MCH RBC QN AUTO: 27.8 PG (ref 27–31)
MCHC RBC AUTO-ENTMCNC: 33.3 G/DL (ref 32–36)
MCV RBC AUTO: 84 FL (ref 82–98)
MONOCYTES # BLD AUTO: 0.5 K/UL (ref 0.3–1)
MONOCYTES NFR BLD: 7.6 % (ref 4–15)
NEUTROPHILS # BLD AUTO: 3.6 K/UL (ref 1.8–7.7)
NEUTROPHILS NFR BLD: 55.3 % (ref 38–73)
NITRITE, POC UA: NORMAL
NRBC BLD-RTO: 0 /100 WBC
PH, POC UA: 5
PLATELET # BLD AUTO: 251 K/UL (ref 150–450)
PMV BLD AUTO: 10.5 FL (ref 9.2–12.9)
POTASSIUM SERPL-SCNC: 4.4 MMOL/L (ref 3.5–5.1)
PROT SERPL-MCNC: 7.6 G/DL (ref 6–8.4)
PROTEIN, POC: NORMAL
RBC # BLD AUTO: 5.51 M/UL (ref 4.6–6.2)
SODIUM SERPL-SCNC: 139 MMOL/L (ref 136–145)
SPECIFIC GRAVITY, POC UA: 1.02
UROBILINOGEN, POC UA: NORMAL
WBC # BLD AUTO: 6.56 K/UL (ref 3.9–12.7)

## 2021-04-28 PROCEDURE — 83690 ASSAY OF LIPASE: CPT | Performed by: PHYSICIAN ASSISTANT

## 2021-04-28 PROCEDURE — 81002 POCT URINE DIPSTICK WITHOUT MICROSCOPE: ICD-10-PCS | Mod: S$GLB,,, | Performed by: PHYSICIAN ASSISTANT

## 2021-04-28 PROCEDURE — 36415 COLL VENOUS BLD VENIPUNCTURE: CPT | Mod: PO | Performed by: PHYSICIAN ASSISTANT

## 2021-04-28 PROCEDURE — 80053 COMPREHEN METABOLIC PANEL: CPT | Performed by: PHYSICIAN ASSISTANT

## 2021-04-28 PROCEDURE — 85025 COMPLETE CBC W/AUTO DIFF WBC: CPT | Performed by: PHYSICIAN ASSISTANT

## 2021-04-28 PROCEDURE — 99999 PR PBB SHADOW E&M-EST. PATIENT-LVL III: CPT | Mod: PBBFAC,,, | Performed by: PHYSICIAN ASSISTANT

## 2021-04-28 PROCEDURE — 1125F AMNT PAIN NOTED PAIN PRSNT: CPT | Mod: S$GLB,,, | Performed by: PHYSICIAN ASSISTANT

## 2021-04-28 PROCEDURE — 99214 OFFICE O/P EST MOD 30 MIN: CPT | Mod: 25,S$GLB,, | Performed by: PHYSICIAN ASSISTANT

## 2021-04-28 PROCEDURE — 81002 URINALYSIS NONAUTO W/O SCOPE: CPT | Mod: S$GLB,,, | Performed by: PHYSICIAN ASSISTANT

## 2021-04-28 PROCEDURE — 99214 PR OFFICE/OUTPT VISIT, EST, LEVL IV, 30-39 MIN: ICD-10-PCS | Mod: 25,S$GLB,, | Performed by: PHYSICIAN ASSISTANT

## 2021-04-28 PROCEDURE — 3008F BODY MASS INDEX DOCD: CPT | Mod: CPTII,S$GLB,, | Performed by: PHYSICIAN ASSISTANT

## 2021-04-28 PROCEDURE — 1125F PR PAIN SEVERITY QUANTIFIED, PAIN PRESENT: ICD-10-PCS | Mod: S$GLB,,, | Performed by: PHYSICIAN ASSISTANT

## 2021-04-28 PROCEDURE — 99999 PR PBB SHADOW E&M-EST. PATIENT-LVL III: ICD-10-PCS | Mod: PBBFAC,,, | Performed by: PHYSICIAN ASSISTANT

## 2021-04-28 PROCEDURE — 3008F PR BODY MASS INDEX (BMI) DOCUMENTED: ICD-10-PCS | Mod: CPTII,S$GLB,, | Performed by: PHYSICIAN ASSISTANT

## 2021-05-12 ENCOUNTER — PATIENT MESSAGE (OUTPATIENT)
Dept: RESEARCH | Facility: HOSPITAL | Age: 26
End: 2021-05-12

## 2021-07-01 ENCOUNTER — PATIENT MESSAGE (OUTPATIENT)
Dept: ADMINISTRATIVE | Facility: OTHER | Age: 26
End: 2021-07-01

## 2021-07-23 ENCOUNTER — OFFICE VISIT (OUTPATIENT)
Dept: URGENT CARE | Facility: CLINIC | Age: 26
End: 2021-07-23
Payer: COMMERCIAL

## 2021-07-23 VITALS
OXYGEN SATURATION: 97 % | WEIGHT: 258.38 LBS | HEART RATE: 88 BPM | DIASTOLIC BLOOD PRESSURE: 81 MMHG | BODY MASS INDEX: 37.07 KG/M2 | TEMPERATURE: 99 F | SYSTOLIC BLOOD PRESSURE: 118 MMHG

## 2021-07-23 DIAGNOSIS — R53.83 FATIGUE, UNSPECIFIED TYPE: ICD-10-CM

## 2021-07-23 DIAGNOSIS — H92.03 ACUTE OTALGIA, BILATERAL: ICD-10-CM

## 2021-07-23 DIAGNOSIS — F45.8 BRUXISM: ICD-10-CM

## 2021-07-23 DIAGNOSIS — R68.89 FLU-LIKE SYMPTOMS: Primary | ICD-10-CM

## 2021-07-23 DIAGNOSIS — H60.313 ACUTE DIFFUSE OTITIS EXTERNA OF BOTH EARS: ICD-10-CM

## 2021-07-23 DIAGNOSIS — Z20.822 CLOSE EXPOSURE TO COVID-19 VIRUS: ICD-10-CM

## 2021-07-23 LAB
CTP QC/QA: YES
SARS-COV-2 RDRP RESP QL NAA+PROBE: NEGATIVE

## 2021-07-23 PROCEDURE — 3008F PR BODY MASS INDEX (BMI) DOCUMENTED: ICD-10-PCS | Mod: CPTII,S$GLB,, | Performed by: NURSE PRACTITIONER

## 2021-07-23 PROCEDURE — 3008F BODY MASS INDEX DOCD: CPT | Mod: CPTII,S$GLB,, | Performed by: NURSE PRACTITIONER

## 2021-07-23 PROCEDURE — U0002 COVID-19 LAB TEST NON-CDC: HCPCS | Mod: QW,S$GLB,, | Performed by: NURSE PRACTITIONER

## 2021-07-23 PROCEDURE — 99999 PR PBB SHADOW E&M-EST. PATIENT-LVL IV: CPT | Mod: PBBFAC,,, | Performed by: NURSE PRACTITIONER

## 2021-07-23 PROCEDURE — U0002: ICD-10-PCS | Mod: QW,S$GLB,, | Performed by: NURSE PRACTITIONER

## 2021-07-23 PROCEDURE — 99214 OFFICE O/P EST MOD 30 MIN: CPT | Mod: S$GLB,,, | Performed by: NURSE PRACTITIONER

## 2021-07-23 PROCEDURE — 99999 PR PBB SHADOW E&M-EST. PATIENT-LVL IV: ICD-10-PCS | Mod: PBBFAC,,, | Performed by: NURSE PRACTITIONER

## 2021-07-23 PROCEDURE — 1126F AMNT PAIN NOTED NONE PRSNT: CPT | Mod: CPTII,S$GLB,, | Performed by: NURSE PRACTITIONER

## 2021-07-23 PROCEDURE — 1126F PR PAIN SEVERITY QUANTIFIED, NO PAIN PRESENT: ICD-10-PCS | Mod: CPTII,S$GLB,, | Performed by: NURSE PRACTITIONER

## 2021-07-23 PROCEDURE — 99214 PR OFFICE/OUTPT VISIT, EST, LEVL IV, 30-39 MIN: ICD-10-PCS | Mod: S$GLB,,, | Performed by: NURSE PRACTITIONER

## 2021-07-23 RX ORDER — PANTOPRAZOLE SODIUM 40 MG/1
40 TABLET, DELAYED RELEASE ORAL DAILY
COMMUNITY
Start: 2021-06-16

## 2021-07-23 RX ORDER — OFLOXACIN 3 MG/ML
10 SOLUTION AURICULAR (OTIC) DAILY
Qty: 10 ML | Refills: 0 | Status: SHIPPED | OUTPATIENT
Start: 2021-07-23

## 2021-08-05 ENCOUNTER — OFFICE VISIT (OUTPATIENT)
Dept: URGENT CARE | Facility: CLINIC | Age: 26
End: 2021-08-05
Payer: COMMERCIAL

## 2021-08-05 VITALS
TEMPERATURE: 99 F | HEART RATE: 90 BPM | DIASTOLIC BLOOD PRESSURE: 81 MMHG | WEIGHT: 255.5 LBS | BODY MASS INDEX: 36.66 KG/M2 | SYSTOLIC BLOOD PRESSURE: 126 MMHG | OXYGEN SATURATION: 97 %

## 2021-08-05 DIAGNOSIS — R11.2 NON-INTRACTABLE VOMITING WITH NAUSEA, UNSPECIFIED VOMITING TYPE: ICD-10-CM

## 2021-08-05 DIAGNOSIS — A08.4 VIRAL GASTROENTERITIS: Primary | ICD-10-CM

## 2021-08-05 DIAGNOSIS — R50.9 FEVER, UNSPECIFIED FEVER CAUSE: ICD-10-CM

## 2021-08-05 LAB
CTP QC/QA: YES
SARS-COV-2 RDRP RESP QL NAA+PROBE: NEGATIVE

## 2021-08-05 PROCEDURE — 1159F PR MEDICATION LIST DOCUMENTED IN MEDICAL RECORD: ICD-10-PCS | Mod: CPTII,S$GLB,, | Performed by: PHYSICIAN ASSISTANT

## 2021-08-05 PROCEDURE — 3008F BODY MASS INDEX DOCD: CPT | Mod: CPTII,S$GLB,, | Performed by: PHYSICIAN ASSISTANT

## 2021-08-05 PROCEDURE — 3079F PR MOST RECENT DIASTOLIC BLOOD PRESSURE 80-89 MM HG: ICD-10-PCS | Mod: CPTII,S$GLB,, | Performed by: PHYSICIAN ASSISTANT

## 2021-08-05 PROCEDURE — 99214 PR OFFICE/OUTPT VISIT, EST, LEVL IV, 30-39 MIN: ICD-10-PCS | Mod: S$GLB,,, | Performed by: PHYSICIAN ASSISTANT

## 2021-08-05 PROCEDURE — U0002 COVID-19 LAB TEST NON-CDC: HCPCS | Mod: QW,S$GLB,, | Performed by: PHYSICIAN ASSISTANT

## 2021-08-05 PROCEDURE — 1160F RVW MEDS BY RX/DR IN RCRD: CPT | Mod: CPTII,S$GLB,, | Performed by: PHYSICIAN ASSISTANT

## 2021-08-05 PROCEDURE — 1159F MED LIST DOCD IN RCRD: CPT | Mod: CPTII,S$GLB,, | Performed by: PHYSICIAN ASSISTANT

## 2021-08-05 PROCEDURE — 99214 OFFICE O/P EST MOD 30 MIN: CPT | Mod: S$GLB,,, | Performed by: PHYSICIAN ASSISTANT

## 2021-08-05 PROCEDURE — 1160F PR REVIEW ALL MEDS BY PRESCRIBER/CLIN PHARMACIST DOCUMENTED: ICD-10-PCS | Mod: CPTII,S$GLB,, | Performed by: PHYSICIAN ASSISTANT

## 2021-08-05 PROCEDURE — 1126F PR PAIN SEVERITY QUANTIFIED, NO PAIN PRESENT: ICD-10-PCS | Mod: CPTII,S$GLB,, | Performed by: PHYSICIAN ASSISTANT

## 2021-08-05 PROCEDURE — U0002: ICD-10-PCS | Mod: QW,S$GLB,, | Performed by: PHYSICIAN ASSISTANT

## 2021-08-05 PROCEDURE — 99999 PR PBB SHADOW E&M-EST. PATIENT-LVL IV: ICD-10-PCS | Mod: PBBFAC,,, | Performed by: PHYSICIAN ASSISTANT

## 2021-08-05 PROCEDURE — 3079F DIAST BP 80-89 MM HG: CPT | Mod: CPTII,S$GLB,, | Performed by: PHYSICIAN ASSISTANT

## 2021-08-05 PROCEDURE — 99999 PR PBB SHADOW E&M-EST. PATIENT-LVL IV: CPT | Mod: PBBFAC,,, | Performed by: PHYSICIAN ASSISTANT

## 2021-08-05 PROCEDURE — 1126F AMNT PAIN NOTED NONE PRSNT: CPT | Mod: CPTII,S$GLB,, | Performed by: PHYSICIAN ASSISTANT

## 2021-08-05 PROCEDURE — 3008F PR BODY MASS INDEX (BMI) DOCUMENTED: ICD-10-PCS | Mod: CPTII,S$GLB,, | Performed by: PHYSICIAN ASSISTANT

## 2021-08-05 PROCEDURE — 3074F PR MOST RECENT SYSTOLIC BLOOD PRESSURE < 130 MM HG: ICD-10-PCS | Mod: CPTII,S$GLB,, | Performed by: PHYSICIAN ASSISTANT

## 2021-08-05 PROCEDURE — 3074F SYST BP LT 130 MM HG: CPT | Mod: CPTII,S$GLB,, | Performed by: PHYSICIAN ASSISTANT

## 2021-08-05 RX ORDER — ONDANSETRON 4 MG/1
4 TABLET, ORALLY DISINTEGRATING ORAL EVERY 6 HOURS PRN
Qty: 12 TABLET | Refills: 0 | Status: SHIPPED | OUTPATIENT
Start: 2021-08-05

## 2021-09-16 ENCOUNTER — OFFICE VISIT (OUTPATIENT)
Dept: URGENT CARE | Facility: CLINIC | Age: 26
End: 2021-09-16
Payer: COMMERCIAL

## 2021-09-16 VITALS
TEMPERATURE: 98 F | HEART RATE: 83 BPM | OXYGEN SATURATION: 97 % | BODY MASS INDEX: 37.17 KG/M2 | DIASTOLIC BLOOD PRESSURE: 58 MMHG | WEIGHT: 259.06 LBS | SYSTOLIC BLOOD PRESSURE: 113 MMHG

## 2021-09-16 DIAGNOSIS — R50.9 FEVER, UNSPECIFIED FEVER CAUSE: Primary | ICD-10-CM

## 2021-09-16 DIAGNOSIS — J02.9 SORE THROAT: ICD-10-CM

## 2021-09-16 DIAGNOSIS — R05.9 COUGH: ICD-10-CM

## 2021-09-16 LAB
CTP QC/QA: YES
MOLECULAR STREP A: NEGATIVE
POC MOLECULAR INFLUENZA A AGN: NEGATIVE
POC MOLECULAR INFLUENZA B AGN: NEGATIVE
SARS-COV-2 RDRP RESP QL NAA+PROBE: NEGATIVE

## 2021-09-16 PROCEDURE — 87651 STREP A DNA AMP PROBE: CPT | Mod: QW,S$GLB,, | Performed by: PHYSICIAN ASSISTANT

## 2021-09-16 PROCEDURE — 3074F PR MOST RECENT SYSTOLIC BLOOD PRESSURE < 130 MM HG: ICD-10-PCS | Mod: CPTII,S$GLB,, | Performed by: PHYSICIAN ASSISTANT

## 2021-09-16 PROCEDURE — 3008F BODY MASS INDEX DOCD: CPT | Mod: CPTII,S$GLB,, | Performed by: PHYSICIAN ASSISTANT

## 2021-09-16 PROCEDURE — U0002 COVID-19 LAB TEST NON-CDC: HCPCS | Mod: QW,S$GLB,, | Performed by: PHYSICIAN ASSISTANT

## 2021-09-16 PROCEDURE — U0002: ICD-10-PCS | Mod: QW,S$GLB,, | Performed by: PHYSICIAN ASSISTANT

## 2021-09-16 PROCEDURE — 87651 POCT STREP A MOLECULAR: ICD-10-PCS | Mod: QW,S$GLB,, | Performed by: PHYSICIAN ASSISTANT

## 2021-09-16 PROCEDURE — 87502 INFLUENZA DNA AMP PROBE: CPT | Mod: QW,S$GLB,, | Performed by: PHYSICIAN ASSISTANT

## 2021-09-16 PROCEDURE — 87502 POCT INFLUENZA A/B MOLECULAR: ICD-10-PCS | Mod: QW,S$GLB,, | Performed by: PHYSICIAN ASSISTANT

## 2021-09-16 PROCEDURE — 99214 OFFICE O/P EST MOD 30 MIN: CPT | Mod: S$GLB,,, | Performed by: PHYSICIAN ASSISTANT

## 2021-09-16 PROCEDURE — 3078F PR MOST RECENT DIASTOLIC BLOOD PRESSURE < 80 MM HG: ICD-10-PCS | Mod: CPTII,S$GLB,, | Performed by: PHYSICIAN ASSISTANT

## 2021-09-16 PROCEDURE — 99999 PR PBB SHADOW E&M-EST. PATIENT-LVL III: ICD-10-PCS | Mod: PBBFAC,,, | Performed by: PHYSICIAN ASSISTANT

## 2021-09-16 PROCEDURE — 3074F SYST BP LT 130 MM HG: CPT | Mod: CPTII,S$GLB,, | Performed by: PHYSICIAN ASSISTANT

## 2021-09-16 PROCEDURE — 99999 PR PBB SHADOW E&M-EST. PATIENT-LVL III: CPT | Mod: PBBFAC,,, | Performed by: PHYSICIAN ASSISTANT

## 2021-09-16 PROCEDURE — 3008F PR BODY MASS INDEX (BMI) DOCUMENTED: ICD-10-PCS | Mod: CPTII,S$GLB,, | Performed by: PHYSICIAN ASSISTANT

## 2021-09-16 PROCEDURE — 99214 PR OFFICE/OUTPT VISIT, EST, LEVL IV, 30-39 MIN: ICD-10-PCS | Mod: S$GLB,,, | Performed by: PHYSICIAN ASSISTANT

## 2021-09-16 PROCEDURE — 1160F RVW MEDS BY RX/DR IN RCRD: CPT | Mod: CPTII,S$GLB,, | Performed by: PHYSICIAN ASSISTANT

## 2021-09-16 PROCEDURE — 1159F PR MEDICATION LIST DOCUMENTED IN MEDICAL RECORD: ICD-10-PCS | Mod: CPTII,S$GLB,, | Performed by: PHYSICIAN ASSISTANT

## 2021-09-16 PROCEDURE — 1160F PR REVIEW ALL MEDS BY PRESCRIBER/CLIN PHARMACIST DOCUMENTED: ICD-10-PCS | Mod: CPTII,S$GLB,, | Performed by: PHYSICIAN ASSISTANT

## 2021-09-16 PROCEDURE — 3078F DIAST BP <80 MM HG: CPT | Mod: CPTII,S$GLB,, | Performed by: PHYSICIAN ASSISTANT

## 2021-09-16 PROCEDURE — 1159F MED LIST DOCD IN RCRD: CPT | Mod: CPTII,S$GLB,, | Performed by: PHYSICIAN ASSISTANT

## 2021-10-29 ENCOUNTER — NURSE TRIAGE (OUTPATIENT)
Dept: ADMINISTRATIVE | Facility: CLINIC | Age: 26
End: 2021-10-29
Payer: COMMERCIAL

## 2022-01-19 ENCOUNTER — HOSPITAL ENCOUNTER (EMERGENCY)
Facility: HOSPITAL | Age: 27
Discharge: HOME | End: 2022-01-19
Attending: EMERGENCY MEDICINE
Payer: COMMERCIAL

## 2022-01-19 VITALS
OXYGEN SATURATION: 98 % | TEMPERATURE: 98 F | SYSTOLIC BLOOD PRESSURE: 140 MMHG | HEIGHT: 71 IN | HEART RATE: 96 BPM | WEIGHT: 269.31 LBS | DIASTOLIC BLOOD PRESSURE: 85 MMHG | BODY MASS INDEX: 37.7 KG/M2 | RESPIRATION RATE: 18 BRPM

## 2022-01-19 DIAGNOSIS — R06.02 SHORTNESS OF BREATH: Primary | ICD-10-CM

## 2022-01-19 DIAGNOSIS — R05.9 COUGH: ICD-10-CM

## 2022-01-19 PROCEDURE — 99284 EMERGENCY DEPT VISIT MOD MDM: CPT | Mod: 25

## 2022-01-19 RX ORDER — ALBUTEROL SULFATE 90 UG/1
1-2 AEROSOL, METERED RESPIRATORY (INHALATION) EVERY 6 HOURS PRN
Qty: 8 G | Refills: 0 | Status: SHIPPED | OUTPATIENT
Start: 2022-01-19 | End: 2023-04-25

## 2022-01-19 RX ORDER — CODEINE PHOSPHATE AND GUAIFENESIN 10; 100 MG/5ML; MG/5ML
5 SOLUTION ORAL 3 TIMES DAILY PRN
Qty: 180 ML | Refills: 0 | Status: SHIPPED | OUTPATIENT
Start: 2022-01-19 | End: 2022-01-29

## 2022-01-30 ENCOUNTER — HOSPITAL ENCOUNTER (EMERGENCY)
Facility: HOSPITAL | Age: 27
Discharge: HOME | End: 2022-01-30
Attending: EMERGENCY MEDICINE
Payer: COMMERCIAL

## 2022-01-30 VITALS
HEIGHT: 71 IN | DIASTOLIC BLOOD PRESSURE: 87 MMHG | SYSTOLIC BLOOD PRESSURE: 141 MMHG | TEMPERATURE: 98 F | HEART RATE: 84 BPM | RESPIRATION RATE: 20 BRPM | BODY MASS INDEX: 37.58 KG/M2 | WEIGHT: 268.44 LBS | OXYGEN SATURATION: 99 %

## 2022-01-30 DIAGNOSIS — M54.42 ACUTE MIDLINE LOW BACK PAIN WITH LEFT-SIDED SCIATICA: Primary | ICD-10-CM

## 2022-01-30 PROCEDURE — 99284 EMERGENCY DEPT VISIT MOD MDM: CPT | Mod: 25

## 2022-01-30 PROCEDURE — 96372 THER/PROPH/DIAG INJ SC/IM: CPT

## 2022-01-30 PROCEDURE — 63600175 PHARM REV CODE 636 W HCPCS: Performed by: EMERGENCY MEDICINE

## 2022-01-30 RX ORDER — HYDROCODONE BITARTRATE AND ACETAMINOPHEN 5; 325 MG/1; MG/1
1 TABLET ORAL EVERY 6 HOURS PRN
Qty: 12 TABLET | Refills: 0 | Status: SHIPPED | OUTPATIENT
Start: 2022-01-30 | End: 2022-02-09

## 2022-01-30 RX ORDER — NAPROXEN 375 MG/1
375 TABLET ORAL 2 TIMES DAILY WITH MEALS
Qty: 14 TABLET | Refills: 0 | Status: SHIPPED | OUTPATIENT
Start: 2022-01-30

## 2022-01-30 RX ORDER — KETOROLAC TROMETHAMINE 30 MG/ML
30 INJECTION, SOLUTION INTRAMUSCULAR; INTRAVENOUS
Status: COMPLETED | OUTPATIENT
Start: 2022-01-30 | End: 2022-01-30

## 2022-01-30 RX ORDER — METHYLPREDNISOLONE ACETATE 40 MG/ML
40 INJECTION, SUSPENSION INTRA-ARTICULAR; INTRALESIONAL; INTRAMUSCULAR; SOFT TISSUE
Status: COMPLETED | OUTPATIENT
Start: 2022-01-30 | End: 2022-01-30

## 2022-01-30 RX ADMIN — METHYLPREDNISOLONE ACETATE 40 MG: 40 INJECTION, SUSPENSION INTRA-ARTICULAR; INTRALESIONAL; INTRAMUSCULAR; SOFT TISSUE at 06:01

## 2022-01-30 RX ADMIN — KETOROLAC TROMETHAMINE 30 MG: 30 INJECTION, SOLUTION INTRAMUSCULAR at 06:01

## 2022-01-30 NOTE — Clinical Note
"Broderick Richmond (Joshua) was seen and treated in our emergency department on 1/30/2022.  He may return to work on 02/04/2022.       If you have any questions or concerns, please don't hesitate to call.      leana neves rn RN    "

## 2022-01-30 NOTE — ED PROVIDER NOTES
"SCRIBE #1 NOTE: I, Los Latia, am scribing for, and in the presence of, Randolph Hayes MD. I have scribed the entire note.       History     Chief Complaint   Patient presents with    Back Pain     Pt was cleaning apartment yesterday and lower back started hurting afterwards. Becoming unbearable. Radiates down L leg and groin with tingling.      Review of patient's allergies indicates:  No Known Allergies      History of Present Illness     HPI    1/30/2022, 6:28 AM  History obtained from the patient      History of Present Illness: Broderick Richmond is a 26 y.o. male patient who presents to the Emergency Department for evaluation of back pain which onset gradually yesterday at 16:00. Pt states he was cleaning his apartment and experienced mild back pain that worsened later when he was getting out of his vehicle. Pt denies any sort of heavy lifting. Pt adds that he felt a "pop" sensation in his back later on in the day. Pt notes a "tingle" feeling that radiates from his front pelvis region that radiates to his L leg.  Symptoms are constant and severe in severity. Mitigating or exacerbating factors include movement. No associated sxs reported. Patient denies any fever, chills, HA, weakness, difficulty urinating, hematuria , and all other sxs at this time. No prior Tx reported. No further complaints or concerns at this time.       Arrival mode: Personal vehicle    PCP: LUIS FERNANDO Li MD        Past Medical History:  Past Medical History:   Diagnosis Date    Ankle fracture, left     Tibia fracture     Right        Past Surgical History:  No past surgical history on file.      Family History:  No family history on file.    Social History:  Social History     Tobacco Use    Smoking status: Never Smoker    Smokeless tobacco: Current User   Substance and Sexual Activity    Alcohol use: Yes     Comment: Occasional    Drug use: No    Sexual activity: Not on file        Review of Systems     Review of Systems "   Constitutional: Negative for chills and fever.   HENT: Negative for sore throat.    Respiratory: Negative for shortness of breath.    Cardiovascular: Negative for chest pain.   Gastrointestinal: Positive for abdominal pain (pelvic). Negative for constipation, nausea and vomiting.   Genitourinary: Negative for difficulty urinating and dysuria.   Musculoskeletal: Positive for back pain.   Skin: Negative for rash.   Neurological: Negative for weakness.   Hematological: Does not bruise/bleed easily.   All other systems reviewed and are negative.     Physical Exam     Initial Vitals [01/30/22 0441]   BP Pulse Resp Temp SpO2   (!) 156/64 101 19 98.8 °F (37.1 °C) 96 %      MAP       --          Physical Exam  Nursing Notes and Vital Signs Reviewed.  Constitutional: Patient is in no acute distress. Well-developed and well-nourished.  Head: Atraumatic. Normocephalic.  Eyes: PERRL. EOM intact. Conjunctivae are not pale. No scleral icterus.  ENT: Mucous membranes are moist. Oropharynx is clear and symmetric.    Neck: Supple. Full ROM. No lymphadenopathy.  Cardiovascular: Regular rate. Regular rhythm. No murmurs, rubs, or gallops. Distal pulses are 2+ and symmetric.  Pulmonary/Chest: No respiratory distress. Clear to auscultation bilaterally. No wheezing or rales.  Abdominal: Soft and non-distended.  There is no tenderness.  No rebound, guarding, or rigidity. Good bowel sounds.  Genitourinary: No CVA tenderness  Musculoskeletal: Moves all extremities. No obvious deformities. No edema. Low midline tenderness.  Skin: Warm and dry.  Neurological:  Alert, awake, and appropriate.  Normal speech.  No acute focal neurological deficits are appreciated.  Psychiatric: Normal affect. Good eye contact. Appropriate in content.     ED Course   Procedures  ED Vital Signs:  Vitals:    01/30/22 0441 01/30/22 0835   BP: (!) 156/64 (!) 141/87   Pulse: 101 84   Resp: 19 20   Temp: 98.8 °F (37.1 °C) 98.2 °F (36.8 °C)   TempSrc: Oral Oral   SpO2:  "96% 99%   Weight: 121.7 kg (268 lb 6.6 oz)    Height: 5' 11" (1.803 m)        Imaging Results:  Imaging Results          X-Ray Lumbar Spine Ap And Lateral (Final result)  Result time 01/30/22 07:07:22   Procedure changed from X-Ray Lumbar Spine 5 View     Final result by Paul Lujan MD (01/30/22 07:07:22)                 Impression:      No significant abnormality.      Electronically signed by: Paul Lujan  Date:    01/30/2022  Time:    07:07             Narrative:    EXAMINATION:  XR LUMBAR SPINE AP AND LATERAL    CLINICAL HISTORY:  pain;    TECHNIQUE:  AP, lateral and spot images were performed of the lumbar spine.    COMPARISON:  None    FINDINGS:  No acute fracture or traumatic malalignment.  Satisfactory alignment of the lumbar spine.  Intervertebral disc spaces are satisfactorily maintained.  Vertebral body heights are satisfactorily maintained.  Incidentally visualized soft tissues demonstrate no significant abnormality.  Partially visualized SI joints are intact.                                    The Emergency Provider reviewed the vital signs and test results, which are outlined above.     ED Discussion       7:56 AM: Reassessed pt at this time. Discussed with pt all pertinent ED information and results. Discussed pt dx and plan of tx. Gave pt all f/u and return to the ED instructions. All questions and concerns were addressed at this time. Pt expresses understanding of information and instructions, and is comfortable with plan to discharge. Pt is stable for discharge.    I discussed with patient and/or family/caretaker that evaluation in the ED does not suggest any emergent or life threatening medical conditions requiring immediate intervention beyond what was provided in the ED, and I believe patient is safe for discharge.  Regardless, an unremarkable evaluation in the ED does not preclude the development or presence of a serious of life threatening condition. As such, patient was instructed " to return immediately for any worsening or change in current symptoms.         Medical Decision Making:   Clinical Tests:   Radiological Study: Ordered and Reviewed           ED Medication(s):  Medications   ketorolac injection 30 mg (30 mg Intramuscular Given 1/30/22 0645)   methylPREDNISolone acetate injection 40 mg (40 mg Intramuscular Given 1/30/22 0646)       Discharge Medication List as of 1/30/2022  7:55 AM      START taking these medications    Details   HYDROcodone-acetaminophen (NORCO) 5-325 mg per tablet Take 1 tablet by mouth every 6 (six) hours as needed for Pain., Starting Sun 1/30/2022, Until Wed 2/9/2022 at 2359, Print      naproxen (NAPROSYN) 375 MG tablet Take 1 tablet (375 mg total) by mouth 2 (two) times daily with meals., Starting Sun 1/30/2022, Print              Follow-up Information     LUIS FERNANDO Li MD In 2 days.    Specialty: Surgery  Contact information:  [address] [facility] [address] [phone]             [facility] - Emergency Dept..    Specialty: Emergency Medicine  Why: As needed, If symptoms worsen  Contact information:  [address] [facility] [address] [phone]                           Scribe Attestation:   Scribe #1: I performed the above scribed service and the documentation accurately describes the services I performed. I attest to the accuracy of the note.     Attending:   Physician Attestation Statement for Scribe #1: I, Randolph Hayes MD, personally performed the services described in this documentation, as scribed by Los Jeffery, in my presence, and it is both accurate and complete.           Clinical Impression       ICD-10-CM ICD-9-CM   1. Acute midline low back pain with left-sided sciatica  M54.42 724.2     724.3       Disposition:   Disposition: Discharged  Condition: Stable         Randolph Hayes MD  02/01/22 0357

## 2023-04-24 LAB
ALBUMIN SERPL BCP-MCNC: 3.9 G/DL (ref 3.5–5.2)
ALP SERPL-CCNC: 72 U/L (ref 55–135)
ALT SERPL W/O P-5'-P-CCNC: 46 U/L (ref 10–44)
ANION GAP SERPL CALC-SCNC: 9 MMOL/L (ref 8–16)
AST SERPL-CCNC: 21 U/L (ref 10–40)
BASOPHILS # BLD AUTO: 0.06 K/UL (ref 0–0.2)
BASOPHILS NFR BLD: 0.8 % (ref 0–1.9)
BILIRUB SERPL-MCNC: 0.3 MG/DL (ref 0.1–1)
BUN SERPL-MCNC: 18 MG/DL (ref 6–20)
CALCIUM SERPL-MCNC: 9.5 MG/DL (ref 8.7–10.5)
CHLORIDE SERPL-SCNC: 107 MMOL/L (ref 95–110)
CO2 SERPL-SCNC: 22 MMOL/L (ref 23–29)
CREAT SERPL-MCNC: 1.2 MG/DL (ref 0.5–1.4)
DIFFERENTIAL METHOD: NORMAL
EOSINOPHIL # BLD AUTO: 0.3 K/UL (ref 0–0.5)
EOSINOPHIL NFR BLD: 3.8 % (ref 0–8)
ERYTHROCYTE [DISTWIDTH] IN BLOOD BY AUTOMATED COUNT: 12.3 % (ref 11.5–14.5)
EST. GFR  (NO RACE VARIABLE): >60 ML/MIN/1.73 M^2
GLUCOSE SERPL-MCNC: 122 MG/DL (ref 70–110)
HCT VFR BLD AUTO: 44.2 % (ref 40–54)
HGB BLD-MCNC: 15 G/DL (ref 14–18)
IMM GRANULOCYTES # BLD AUTO: 0.04 K/UL (ref 0–0.04)
IMM GRANULOCYTES NFR BLD AUTO: 0.5 % (ref 0–0.5)
LYMPHOCYTES # BLD AUTO: 2 K/UL (ref 1–4.8)
LYMPHOCYTES NFR BLD: 25 % (ref 18–48)
MCH RBC QN AUTO: 27.7 PG (ref 27–31)
MCHC RBC AUTO-ENTMCNC: 33.9 G/DL (ref 32–36)
MCV RBC AUTO: 82 FL (ref 82–98)
MONOCYTES # BLD AUTO: 0.5 K/UL (ref 0.3–1)
MONOCYTES NFR BLD: 6.3 % (ref 4–15)
NEUTROPHILS # BLD AUTO: 5.1 K/UL (ref 1.8–7.7)
NEUTROPHILS NFR BLD: 63.6 % (ref 38–73)
NRBC BLD-RTO: 0 /100 WBC
PLATELET # BLD AUTO: 220 K/UL (ref 150–450)
PMV BLD AUTO: 10.3 FL (ref 9.2–12.9)
POTASSIUM SERPL-SCNC: 3.7 MMOL/L (ref 3.5–5.1)
PROT SERPL-MCNC: 7.4 G/DL (ref 6–8.4)
RBC # BLD AUTO: 5.42 M/UL (ref 4.6–6.2)
SODIUM SERPL-SCNC: 138 MMOL/L (ref 136–145)
WBC # BLD AUTO: 8 K/UL (ref 3.9–12.7)

## 2023-04-24 PROCEDURE — 63600175 PHARM REV CODE 636 W HCPCS: Performed by: EMERGENCY MEDICINE

## 2023-04-24 PROCEDURE — 85025 COMPLETE CBC W/AUTO DIFF WBC: CPT | Performed by: EMERGENCY MEDICINE

## 2023-04-24 PROCEDURE — 96361 HYDRATE IV INFUSION ADD-ON: CPT

## 2023-04-24 PROCEDURE — 99285 EMERGENCY DEPT VISIT HI MDM: CPT | Mod: 25

## 2023-04-24 PROCEDURE — 80053 COMPREHEN METABOLIC PANEL: CPT | Performed by: EMERGENCY MEDICINE

## 2023-04-24 RX ADMIN — SODIUM CHLORIDE, POTASSIUM CHLORIDE, SODIUM LACTATE AND CALCIUM CHLORIDE 1000 ML: 600; 310; 30; 20 INJECTION, SOLUTION INTRAVENOUS at 09:04

## 2023-04-25 ENCOUNTER — HOSPITAL ENCOUNTER (OUTPATIENT)
Facility: HOSPITAL | Age: 28
Discharge: HOME | End: 2023-04-25
Attending: EMERGENCY MEDICINE | Admitting: HOSPITALIST
Payer: MEDICAID

## 2023-04-25 VITALS
BODY MASS INDEX: 38.92 KG/M2 | TEMPERATURE: 99 F | DIASTOLIC BLOOD PRESSURE: 75 MMHG | OXYGEN SATURATION: 97 % | HEART RATE: 95 BPM | RESPIRATION RATE: 18 BRPM | WEIGHT: 278 LBS | SYSTOLIC BLOOD PRESSURE: 107 MMHG | HEIGHT: 71 IN

## 2023-04-25 DIAGNOSIS — R07.9 CHEST PAIN: ICD-10-CM

## 2023-04-25 DIAGNOSIS — R06.02 SOB (SHORTNESS OF BREATH): ICD-10-CM

## 2023-04-25 DIAGNOSIS — Z77.098 EXPOSURE TO CHEMICAL INHALATION: Primary | ICD-10-CM

## 2023-04-25 PROCEDURE — 63600175 PHARM REV CODE 636 W HCPCS: Performed by: NURSE PRACTITIONER

## 2023-04-25 PROCEDURE — G0378 HOSPITAL OBSERVATION PER HR: HCPCS

## 2023-04-25 PROCEDURE — 96361 HYDRATE IV INFUSION ADD-ON: CPT

## 2023-04-25 PROCEDURE — 96374 THER/PROPH/DIAG INJ IV PUSH: CPT

## 2023-04-25 PROCEDURE — 96376 TX/PRO/DX INJ SAME DRUG ADON: CPT

## 2023-04-25 RX ORDER — ALBUTEROL SULFATE 90 UG/1
1-2 AEROSOL, METERED RESPIRATORY (INHALATION) EVERY 4 HOURS PRN
Qty: 8 G | Refills: 0 | Status: SHIPPED | OUTPATIENT
Start: 2023-04-25 | End: 2024-04-24

## 2023-04-25 RX ORDER — DEXTROSE 40 %
15 GEL (GRAM) ORAL
Status: DISCONTINUED | OUTPATIENT
Start: 2023-04-25 | End: 2023-04-25 | Stop reason: HOSPADM

## 2023-04-25 RX ORDER — TALC
6 POWDER (GRAM) TOPICAL NIGHTLY PRN
Status: DISCONTINUED | OUTPATIENT
Start: 2023-04-25 | End: 2023-04-25 | Stop reason: HOSPADM

## 2023-04-25 RX ORDER — HYDROCODONE BITARTRATE AND ACETAMINOPHEN 5; 325 MG/1; MG/1
1 TABLET ORAL EVERY 6 HOURS PRN
Status: DISCONTINUED | OUTPATIENT
Start: 2023-04-25 | End: 2023-04-25 | Stop reason: HOSPADM

## 2023-04-25 RX ORDER — SODIUM CHLORIDE 0.9 % (FLUSH) 0.9 %
3 SYRINGE (ML) INJECTION EVERY 12 HOURS PRN
Status: DISCONTINUED | OUTPATIENT
Start: 2023-04-25 | End: 2023-04-25 | Stop reason: HOSPADM

## 2023-04-25 RX ORDER — PROMETHAZINE HYDROCHLORIDE 25 MG/1
25 TABLET ORAL EVERY 6 HOURS PRN
Status: DISCONTINUED | OUTPATIENT
Start: 2023-04-25 | End: 2023-04-25 | Stop reason: HOSPADM

## 2023-04-25 RX ORDER — POLYETHYLENE GLYCOL 3350 17 G/17G
17 POWDER, FOR SOLUTION ORAL DAILY PRN
Status: DISCONTINUED | OUTPATIENT
Start: 2023-04-25 | End: 2023-04-25 | Stop reason: HOSPADM

## 2023-04-25 RX ORDER — PREDNISONE 20 MG/1
TABLET ORAL
Qty: 10 TABLET | Refills: 0 | Status: SHIPPED | OUTPATIENT
Start: 2023-04-25 | End: 2023-05-01

## 2023-04-25 RX ORDER — ACETAMINOPHEN 325 MG/1
650 TABLET ORAL EVERY 8 HOURS PRN
Status: DISCONTINUED | OUTPATIENT
Start: 2023-04-25 | End: 2023-04-25 | Stop reason: HOSPADM

## 2023-04-25 RX ORDER — NALOXONE HCL 0.4 MG/ML
0.02 VIAL (ML) INJECTION
Status: DISCONTINUED | OUTPATIENT
Start: 2023-04-25 | End: 2023-04-25 | Stop reason: HOSPADM

## 2023-04-25 RX ORDER — ACETAMINOPHEN 650 MG/1
650 SUPPOSITORY RECTAL EVERY 4 HOURS PRN
Status: DISCONTINUED | OUTPATIENT
Start: 2023-04-25 | End: 2023-04-25 | Stop reason: HOSPADM

## 2023-04-25 RX ORDER — GLUCAGON 1 MG
1 KIT INJECTION
Status: DISCONTINUED | OUTPATIENT
Start: 2023-04-25 | End: 2023-04-25 | Stop reason: HOSPADM

## 2023-04-25 RX ORDER — IPRATROPIUM BROMIDE AND ALBUTEROL SULFATE 2.5; .5 MG/3ML; MG/3ML
3 SOLUTION RESPIRATORY (INHALATION) EVERY 4 HOURS PRN
Status: DISCONTINUED | OUTPATIENT
Start: 2023-04-25 | End: 2023-04-25 | Stop reason: HOSPADM

## 2023-04-25 RX ORDER — DEXTROSE 40 %
30 GEL (GRAM) ORAL
Status: DISCONTINUED | OUTPATIENT
Start: 2023-04-25 | End: 2023-04-25 | Stop reason: HOSPADM

## 2023-04-25 RX ORDER — SIMETHICONE 80 MG
1 TABLET,CHEWABLE ORAL 4 TIMES DAILY PRN
Status: DISCONTINUED | OUTPATIENT
Start: 2023-04-25 | End: 2023-04-25 | Stop reason: HOSPADM

## 2023-04-25 RX ORDER — MORPHINE SULFATE 4 MG/ML
2 INJECTION, SOLUTION INTRAMUSCULAR; INTRAVENOUS EVERY 4 HOURS PRN
Status: DISCONTINUED | OUTPATIENT
Start: 2023-04-25 | End: 2023-04-25 | Stop reason: HOSPADM

## 2023-04-25 RX ORDER — MAG HYDROX/ALUMINUM HYD/SIMETH 200-200-20
30 SUSPENSION, ORAL (FINAL DOSE FORM) ORAL 4 TIMES DAILY PRN
Status: DISCONTINUED | OUTPATIENT
Start: 2023-04-25 | End: 2023-04-25 | Stop reason: HOSPADM

## 2023-04-25 RX ORDER — SODIUM CHLORIDE, SODIUM LACTATE, POTASSIUM CHLORIDE, CALCIUM CHLORIDE 600; 310; 30; 20 MG/100ML; MG/100ML; MG/100ML; MG/100ML
INJECTION, SOLUTION INTRAVENOUS CONTINUOUS
Status: DISCONTINUED | OUTPATIENT
Start: 2023-04-25 | End: 2023-04-25

## 2023-04-25 RX ORDER — ONDANSETRON 2 MG/ML
4 INJECTION INTRAMUSCULAR; INTRAVENOUS EVERY 8 HOURS PRN
Status: DISCONTINUED | OUTPATIENT
Start: 2023-04-25 | End: 2023-04-25 | Stop reason: HOSPADM

## 2023-04-25 RX ADMIN — METHYLPREDNISOLONE SODIUM SUCCINATE 40 MG: 40 INJECTION, POWDER, FOR SOLUTION INTRAMUSCULAR; INTRAVENOUS at 12:04

## 2023-04-25 RX ADMIN — METHYLPREDNISOLONE SODIUM SUCCINATE 60 MG: 40 INJECTION, POWDER, FOR SOLUTION INTRAMUSCULAR; INTRAVENOUS at 03:04

## 2023-04-25 RX ADMIN — SODIUM CHLORIDE, POTASSIUM CHLORIDE, SODIUM LACTATE AND CALCIUM CHLORIDE: 600; 310; 30; 20 INJECTION, SOLUTION INTRAVENOUS at 03:04

## 2023-04-25 NOTE — MEDICAL/APP STUDENT
Hospital Medicine                                                       History and Physical     Admit Date: 4/25/2023    Chief Complaint:  Exposure to chemical inhalation    HPI:    Broderick Richmond is a 27 y.o. male who presented to ED last night complaining of SOB, chest tightness, and dizziness after unintentionally inhaling pool chemicals on Sunday night 4/23. 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 He says he began feeling symptoms of lightheadedness, dizziness, and SOB within the hour. He used his mom's inhaler to help relieve the symptoms with mild relief. That night he says he slept horribly and then woke up on Monday morning feeling even worse--says he was having extreme difficulty completing whole sentences, experiencing SOB upon movement, and feeling like he needed to cough with no productive sputum. He then went to  where he received sodium bicarb with worsening of symptoms. He had to lie down and elevate his legs.   He then came to ED where he received 60mg Solu-Medrol IVP. CXR showed no acute abnormalities. Denies any hx of tobacco use or lung disease. Pulmonologist on call consulted. Pt admitted to receive 60mg IVP Solu-Medrol q6hrs.     This am 4/25 he currently denies CP, N/V/D, lightheadedness. Says he has gotten up to go to the bathroom and that his SOB has improved. Speaking in full sentences without SOB. Admits to lingering chest tightness along his central sternum. Girlfriend present during visit and had brought some breakfast. Pt says he has a good appetite. O2 sats >99 on RA    Past Medical History:   Diagnosis Date    Ankle fracture, left     Tibia fracture     Right         History reviewed. No pertinent surgical history.     Social History     Socioeconomic History    Marital status: Single   Tobacco Use    Smoking status: Never    Smokeless tobacco: Current    Substance and Sexual Activity    Alcohol use: Yes     Comment: Occasional    Drug use: No        Family History   Problem Relation Age of Onset    No Known Problems Mother     No Known Problems Father         Review of patient's allergies indicates:  No Known Allergies    Current Facility-Administered Medications   Medication    acetaminophen suppository 650 mg    acetaminophen tablet 650 mg    albuterol-ipratropium 2.5 mg-0.5 mg/3 mL nebulizer solution 3 mL    aluminum-magnesium hydroxide-simethicone 200-200-20 mg/5 mL suspension 30 mL    dextrose 10% bolus 125 mL 125 mL    dextrose 10% bolus 250 mL 250 mL    dextrose 40 % gel 15,000 mg    dextrose 40 % gel 30,000 mg    glucagon (human recombinant) injection 1 mg    HYDROcodone-acetaminophen 5-325 mg per tablet 1 tablet    lactated ringers infusion    melatonin tablet 6 mg    methylPREDNISolone sodium succinate injection 60 mg    morphine injection 2 mg    naloxone 0.4 mg/mL injection 0.02 mg    ondansetron injection 4 mg    polyethylene glycol packet 17 g    promethazine tablet 25 mg    simethicone chewable tablet 80 mg    sodium chloride 0.9% flush 3 mL     Current Outpatient Medications   Medication Sig    albuterol (PROVENTIL/VENTOLIN HFA) 90 mcg/actuation inhaler Inhale 1-2 puffs into the lungs every 6 (six) hours as needed for Wheezing. Rescue    cetirizine (ZYRTEC) 10 mg Cap     loratadine (CLARITIN) 10 mg tablet Take 10 mg by mouth.    naproxen (NAPROSYN) 375 MG tablet Take 1 tablet (375 mg total) by mouth 2 (two) times daily with meals.    ofloxacin (FLOXIN) 0.3 % otic solution Place 10 drops into both ears once daily.    ondansetron (ZOFRAN-ODT) 4 MG TbDL Take 1 tablet (4 mg total) by mouth every 6 (six) hours as needed.    ondansetron (ZOFRAN-ODT) 4 MG TbDL Take 1 tablet (4 mg total) by mouth every 6 (six) hours as needed (nausea).    pantoprazole (PROTONIX) 40 MG tablet Take 40 mg by mouth once daily.       VITAL SIGNS (Last 24H):  Temp:  [98.5 °F  (36.9 °C)-98.7 °F (37.1 °C)]   Pulse:  [72-90]   Resp:  [18]   BP: (120-158)/(59-99)   SpO2:  [96 %-100 %]     INTAKE & OUTPUT (Last 24H):    Intake/Output Summary (Last 24 hours) at 4/25/2023 0941  Last data filed at 4/24/2023 2216  Gross per 24 hour   Intake 1000 ml   Output --   Net 1000 ml       REVIEW OF SYSTEMS:        Review of Systems   Constitutional:  Positive for malaise/fatigue. Negative for chills, diaphoresis, fever and weight loss.   HENT: Negative.     Eyes: Negative.    Respiratory:  Positive for shortness of breath. Negative for cough, hemoptysis, sputum production and wheezing.         SOB improved   Cardiovascular: Negative.    Gastrointestinal: Negative.  Negative for nausea and vomiting.   Genitourinary: Negative.    Musculoskeletal:         Chest tightness around sternum   Skin: Negative.    Neurological:  Positive for weakness. Negative for dizziness, speech change, focal weakness and headaches.        Weakness improved   Psychiatric/Behavioral: Negative.        PHYSICAL EXAM   Physical Exam   Constitutional: He is oriented to person, place, and time. normal appearance.  Non-toxic appearance. No distress.   HENT:   Head: Normocephalic and atraumatic.   Eyes: Pupils are equal, round, and reactive to light. Conjunctivae are normal.   Cardiovascular: Normal rate and regular rhythm. Exam reveals no gallop and no friction rub.   No murmur heard.Pulmonary:      Effort: Pulmonary effort is normal. No respiratory distress.      Breath sounds: Normal breath sounds. No stridor. No wheezing or rhonchi.   Chest:      Chest wall: Tenderness present.     Abdominal: Normal appearance and bowel sounds are normal.   Musculoskeletal:         General: Normal range of motion.      Cervical back: Normal range of motion and neck supple.   Lymphadenopathy:     He has no cervical adenopathy.   Neurological: He is alert and oriented to person, place, and time. He displays no weakness. No cranial nerve deficit or  sensory deficit. Coordination normal.   Skin: Skin is warm. Capillary refill takes less than 2 seconds. He is not diaphoretic.   Psychiatric: His behavior is normal. Mood, judgment and thought content normal.   Vitals reviewed.     Laboratory Data:  Reviewed and noted in plan where applicable. Please see chart for full laboratory data.    No results for input(s): CPK, CPKMB, TROPONINI, MB in the last 24 hours. No results for input(s): POCTGLUCOSE in the last 24 hours.     No results found for: INR, PROTIME    Lab Results   Component Value Date    WBC 8.00 04/24/2023    HGB 15.0 04/24/2023    HCT 44.2 04/24/2023    MCV 82 04/24/2023     04/24/2023       BMP  Recent Labs   Lab 04/24/23 2050   *      K 3.7      CO2 22*   BUN 18   CREATININE 1.2   CALCIUM 9.5         Radiology:  4/25 CXR: No acute abnormality.      ASSESSMENT/PLAN:     * Exposure to chemical inhalation  No wheezing  Cont steroids   Prn nebs if needed   Wean steroids as able  On room air        Thank you for your consult. I will follow-up with patient. Please contact us if you have any additional questions.     Stella CARRANZA examined the patient, reviewed the medical record, discussed with Yohana TOMLINSON and formulated the plan of care.

## 2023-04-25 NOTE — DISCHARGE SUMMARY
[facility] - Emergency Dept.  Hospital Medicine  Discharge Summary      Patient Name: Broderick Richmond  MRN: [id number]  [facility]: [id number]  Patient Class: OP- Observation  Admission Date: 4/25/2023  Hospital Length of Stay: 0 days  Discharge Date and Time:  04/25/2023 5:57 PM  Attending Physician: Jaki att. providers found   Discharging Provider: Liliam Vicente NP  Primary Care Provider: Primary Doctor Jaki    Primary Care Team: Networked reference to record PCT     HPI:   Broderick Richmond is a 27 y.o. male with a PMH  has a past medical history of Ankle fracture, left and Tibia fracture.  Presented to the ER f after being referred here from urgent care or further evaluation of shortness of breath has reversal of toxic inhalation pool chemicals.  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  Patient states he began feeling lightheaded/dizzy, short of breath, sore throat, weak/fatigued, and so seeing floaters with symptoms of near syncope. Patient patient was seen at urgent care prior to arrival and was given nebulized treatment of sodium bicarbonate which she states made his breathing worse.  Denies any actual chest pain or palpitations.  Denies any other symptoms.  ER workup unremarkable.  Patient received 60 mg of Solu-Medrol IV P. on-call pulmonologist was consulted and recommended admission to hospital medicine and q.6 hour 60 mg Solu-Medrol IV pushes.  Patient is in agreement with treatment plan.  Patient will be placed under observation status.    PCP: Primary Doctor No        * No surgery found *      Hospital Course:   Mr. Sarkar is a 27-year-old male who was admitted to [facility] after exposure to pool chemicals causing him to become dyspneic.  He was started on solumedrol.  Chest xray with no acute disease.  Pulmonology was also consulted and evaluated patient and recommended steroid taper upon discharge.  Patient seen and assessed this afternoon, chest tightness and  dyspnea improved.  O2 sats stable on room air.  He has been able to ambulate without complaints of dyspnea.  He will discharge to home today and complete a taper course of prednisone.  He may follow-up with PCP later this week.        Goals of Care Treatment Preferences:  Code Status: Full Code      Consults:   Consults (From admission, onward)        Status Ordering Provider     Inpatient consult to Pulmonology  Once        Provider:  Jack Buck MD    Completed JESSICA GRIDER          No new Assessment & Plan notes have been filed under this hospital service since the last note was generated.  Service: Hospital Medicine    Final Active Diagnoses:    Diagnosis Date Noted POA    PRINCIPAL PROBLEM:  Exposure to chemical inhalation [Z77.098] 04/25/2023 Yes      Problems Resolved During this Admission:       Discharged Condition: good    Disposition: Home or Self Care    Follow Up:   Follow-up Information     Primary Doctor No .                     Patient Instructions:   No discharge procedures on file.    Significant Diagnostic Studies: Labs:   BMP:   Recent Labs   Lab 04/24/23 2050   *      K 3.7      CO2 22*   BUN 18   CREATININE 1.2   CALCIUM 9.5    and CMP   Recent Labs   Lab 04/24/23 2050      K 3.7      CO2 22*   *   BUN 18   CREATININE 1.2   CALCIUM 9.5   PROT 7.4   ALBUMIN 3.9   BILITOT 0.3   ALKPHOS 72   AST 21   ALT 46*   ANIONGAP 9       Pending Diagnostic Studies:     None         Medications:  Reconciled Home Medications:      Medication List      START taking these medications    predniSONE 20 MG tablet  Commonly known as: DELTASONE  Take 2 tablets (40 mg total) by mouth once daily for 2 days, THEN 2 tablets (40 mg total) once daily for 2 days, THEN 1 tablet (20 mg total) once daily for 2 days.  Start taking on: April 25, 2023        CHANGE how you take these medications    albuterol 90 mcg/actuation inhaler  Commonly known as: PROVENTIL/VENTOLIN  HFA  Inhale 1-2 puffs into the lungs every 4 (four) hours as needed for Wheezing or Shortness of Breath. Rescue  What changed:   · when to take this  · reasons to take this        CONTINUE taking these medications    loratadine 10 mg tablet  Commonly known as: CLARITIN  Take 10 mg by mouth.     naproxen 375 MG tablet  Commonly known as: NAPROSYN  Take 1 tablet (375 mg total) by mouth 2 (two) times daily with meals.     ofloxacin 0.3 % otic solution  Commonly known as: FLOXIN  Place 10 drops into both ears once daily.     * ondansetron 4 MG Tbdl  Commonly known as: ZOFRAN-ODT  Take 1 tablet (4 mg total) by mouth every 6 (six) hours as needed.     * ondansetron 4 MG Tbdl  Commonly known as: ZOFRAN-ODT  Take 1 tablet (4 mg total) by mouth every 6 (six) hours as needed (nausea).     pantoprazole 40 MG tablet  Commonly known as: PROTONIX  Take 40 mg by mouth once daily.     ZyrTEC 10 mg Cap  Generic drug: cetirizine         * This list has 2 medication(s) that are the same as other medications prescribed for you. Read the directions carefully, and ask your doctor or other care provider to review them with you.                Indwelling Lines/Drains at time of discharge:   Lines/Drains/Airways     None                 Time spent on the discharge of patient: >30 mins    Liliam Vicente NP  Department of Hospital Medicine  [facility] - Emergency Dept.

## 2023-04-25 NOTE — SUBJECTIVE & OBJECTIVE
Past Medical History:   Diagnosis Date    Ankle fracture, left     Tibia fracture     Right        History reviewed. No pertinent surgical history.    Review of patient's allergies indicates:  No Known Allergies    Family History       Problem Relation (Age of Onset)    No Known Problems Mother, Father          Tobacco Use    Smoking status: Never    Smokeless tobacco: Current   Substance and Sexual Activity    Alcohol use: Yes     Comment: Occasional    Drug use: No    Sexual activity: Not on file         Review of Systems   Constitutional: Negative.    HENT: Negative.     Eyes: Negative.    Respiratory:  Positive for cough and shortness of breath. Negative for wheezing.    Cardiovascular: Negative.    Gastrointestinal: Negative.    Genitourinary: Negative.    Musculoskeletal: Negative.    Neurological: Negative.    Objective:     Vital Signs (Most Recent):  Temp: 98.5 °F (36.9 °C) (04/24/23 2322)  Pulse: 74 (04/25/23 0603)  Resp: 18 (04/24/23 2322)  BP: (!) 120/59 (04/25/23 0603)  SpO2: 96 % (04/25/23 0603)   Vital Signs (24h Range):  Temp:  [98.5 °F (36.9 °C)-98.7 °F (37.1 °C)] 98.5 °F (36.9 °C)  Pulse:  [72-90] 74  Resp:  [18] 18  SpO2:  [96 %-100 %] 96 %  BP: (120-158)/(59-99) 120/59     Weight: 126.1 kg (278 lb)  Body mass index is 38.77 kg/m².      Intake/Output Summary (Last 24 hours) at 4/25/2023 0848  Last data filed at 4/24/2023 2216  Gross per 24 hour   Intake 1000 ml   Output --   Net 1000 ml       Physical Exam  Vitals and nursing note reviewed.   Constitutional:       General: He is not in acute distress.  HENT:      Head: Normocephalic and atraumatic.   Eyes:      Pupils: Pupils are equal, round, and reactive to light.   Cardiovascular:      Rate and Rhythm: Normal rate and regular rhythm.      Heart sounds: No murmur heard.  Pulmonary:      Effort: No respiratory distress.      Breath sounds: No wheezing or rales.   Abdominal:      General: Abdomen is flat. Bowel sounds are normal.      Palpations:  Abdomen is soft.   Musculoskeletal:         General: No swelling or tenderness.      Cervical back: Neck supple. No rigidity.   Neurological:      General: No focal deficit present.      Mental Status: He is alert and oriented to person, place, and time.       Vents:       Lines/Drains/Airways       Peripheral Intravenous Line  Duration                  Peripheral IV - Single Lumen 04/25/23 0300 20 G Left;Posterior Hand <1 day                    Significant Labs:    CBC/Anemia Profile:  Recent Labs   Lab 04/24/23 2050   WBC 8.00   HGB 15.0   HCT 44.2      MCV 82   RDW 12.3        Chemistries:  Recent Labs   Lab 04/24/23 2050      K 3.7      CO2 22*   BUN 18   CREATININE 1.2   CALCIUM 9.5   ALBUMIN 3.9   PROT 7.4   BILITOT 0.3   ALKPHOS 72   ALT 46*   AST 21       All pertinent labs within the past 24 hours have been reviewed.    Significant Imaging:   I have reviewed all pertinent imaging results/findings within the past 24 hours.  I have reviewed and interpreted all pertinent imaging results/findings within the past 24 hours.
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
## 2023-04-25 NOTE — ASSESSMENT & PLAN NOTE
Chest x-ray without acute cardiopulmonary findings.  O2 saturation % room air.  No acute respiratory distress noted.  Maintaining airway.  Plan:  -tele monitoring   -continues pulse oximetry  -supplemental oxygen as needed   -60 mg Solu-Medrol q.6 hours  -IVFs  -neb treatments as needed  -pulmonologist seen a.m.

## 2023-04-25 NOTE — Clinical Note
Aspen Marx accompanied their family member to the emergency department on 4/24/2023. They may return to work on 04/26/2023.      If you have any questions or concerns, please don't hesitate to call.      Dariusz Liang MD RN
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
## 2023-04-25 NOTE — SUBJECTIVE & OBJECTIVE
Past Medical History:   Diagnosis Date    Ankle fracture, left     Tibia fracture     Right        History reviewed. No pertinent surgical history.    Review of patient's allergies indicates:  No Known Allergies    No current facility-administered medications on file prior to encounter.     Current Outpatient Medications on File Prior to Encounter   Medication Sig    albuterol (PROVENTIL/VENTOLIN HFA) 90 mcg/actuation inhaler Inhale 1-2 puffs into the lungs every 6 (six) hours as needed for Wheezing. Rescue    cetirizine (ZYRTEC) 10 mg Cap     loratadine (CLARITIN) 10 mg tablet Take 10 mg by mouth.    naproxen (NAPROSYN) 375 MG tablet Take 1 tablet (375 mg total) by mouth 2 (two) times daily with meals.    ofloxacin (FLOXIN) 0.3 % otic solution Place 10 drops into both ears once daily.    ondansetron (ZOFRAN-ODT) 4 MG TbDL Take 1 tablet (4 mg total) by mouth every 6 (six) hours as needed.    ondansetron (ZOFRAN-ODT) 4 MG TbDL Take 1 tablet (4 mg total) by mouth every 6 (six) hours as needed (nausea).    pantoprazole (PROTONIX) 40 MG tablet Take 40 mg by mouth once daily.     Family History       Problem Relation (Age of Onset)    No Known Problems Mother, Father          Tobacco Use    Smoking status: Never    Smokeless tobacco: Current   Substance and Sexual Activity    Alcohol use: Yes     Comment: Occasional    Drug use: No    Sexual activity: Not on file     Review of Systems   Constitutional:  Positive for fatigue. Negative for chills, diaphoresis and fever.   HENT:  Positive for sore throat. Negative for trouble swallowing and voice change.    Respiratory:  Positive for shortness of breath. Negative for cough, choking and wheezing.    Cardiovascular:  Negative for chest pain and palpitations.   Neurological:  Positive for dizziness and light-headedness. Negative for headaches.   All other systems reviewed and are negative.  Objective:     Vital Signs (Most Recent):  Temp: 98.5 °F (36.9 °C) (04/24/23  2322)  Pulse: 73 (04/25/23 0403)  Resp: 18 (04/24/23 2322)  BP: (!) 144/67 (04/25/23 0403)  SpO2: 98 % (04/25/23 0403)   Vital Signs (24h Range):  Temp:  [98.5 °F (36.9 °C)-98.7 °F (37.1 °C)] 98.5 °F (36.9 °C)  Pulse:  [73-90] 73  Resp:  [18] 18  SpO2:  [98 %-100 %] 98 %  BP: (122-158)/(67-99) 144/67     Weight: 126.1 kg (278 lb)  Body mass index is 38.77 kg/m².    Physical Exam  Vitals and nursing note reviewed.   Constitutional:       General: He is awake. He is not in acute distress.     Appearance: Normal appearance. He is well-developed and well-groomed. He is not ill-appearing, toxic-appearing or diaphoretic.   HENT:      Head: Normocephalic and atraumatic.      Mouth/Throat:      Lips: Pink.      Mouth: Mucous membranes are moist.      Pharynx: Oropharynx is clear. Uvula midline.   Eyes:      Extraocular Movements: Extraocular movements intact.      Conjunctiva/sclera: Conjunctivae normal.   Cardiovascular:      Rate and Rhythm: Normal rate and regular rhythm.      Pulses: Normal pulses.      Heart sounds: Normal heart sounds. No murmur heard.  Pulmonary:      Effort: Pulmonary effort is normal.      Breath sounds: Normal breath sounds.   Abdominal:      General: Bowel sounds are normal.      Palpations: Abdomen is soft.      Tenderness: There is no abdominal tenderness.   Musculoskeletal:      Cervical back: Normal range of motion and neck supple.      Comments: 5/5 strength throughout   Skin:     General: Skin is warm and dry.      Capillary Refill: Capillary refill takes less than 2 seconds.   Neurological:      General: No focal deficit present.      Mental Status: He is alert and oriented to person, place, and time. Mental status is at baseline.      GCS: GCS eye subscore is 4. GCS verbal subscore is 5. GCS motor subscore is 6.      Cranial Nerves: Cranial nerves 2-12 are intact.      Motor: Motor function is intact.      Coordination: Coordination is intact.   Psychiatric:         Mood and Affect: Mood  normal.         Behavior: Behavior normal. Behavior is cooperative.         LABS:  Recent Results (from the past 24 hour(s))   CBC auto differential    Collection Time: 04/24/23  8:50 PM   Result Value Ref Range    WBC 8.00 3.90 - 12.70 K/uL    RBC 5.42 4.60 - 6.20 M/uL    Hemoglobin 15.0 14.0 - 18.0 g/dL    Hematocrit 44.2 40.0 - 54.0 %    MCV 82 82 - 98 fL    MCH 27.7 27.0 - 31.0 pg    MCHC 33.9 32.0 - 36.0 g/dL    RDW 12.3 11.5 - 14.5 %    Platelets 220 150 - 450 K/uL    MPV 10.3 9.2 - 12.9 fL    Immature Granulocytes 0.5 0.0 - 0.5 %    Gran # (ANC) 5.1 1.8 - 7.7 K/uL    Immature Grans (Abs) 0.04 0.00 - 0.04 K/uL    Lymph # 2.0 1.0 - 4.8 K/uL    Mono # 0.5 0.3 - 1.0 K/uL    Eos # 0.3 0.0 - 0.5 K/uL    Baso # 0.06 0.00 - 0.20 K/uL    nRBC 0 0 /100 WBC    Gran % 63.6 38.0 - 73.0 %    Lymph % 25.0 18.0 - 48.0 %    Mono % 6.3 4.0 - 15.0 %    Eosinophil % 3.8 0.0 - 8.0 %    Basophil % 0.8 0.0 - 1.9 %    Differential Method Automated    Comprehensive metabolic panel    Collection Time: 04/24/23  8:50 PM   Result Value Ref Range    Sodium 138 136 - 145 mmol/L    Potassium 3.7 3.5 - 5.1 mmol/L    Chloride 107 95 - 110 mmol/L    CO2 22 (L) 23 - 29 mmol/L    Glucose 122 (H) 70 - 110 mg/dL    BUN 18 6 - 20 mg/dL    Creatinine 1.2 0.5 - 1.4 mg/dL    Calcium 9.5 8.7 - 10.5 mg/dL    Total Protein 7.4 6.0 - 8.4 g/dL    Albumin 3.9 3.5 - 5.2 g/dL    Total Bilirubin 0.3 0.1 - 1.0 mg/dL    Alkaline Phosphatase 72 55 - 135 U/L    AST 21 10 - 40 U/L    ALT 46 (H) 10 - 44 U/L    Anion Gap 9 8 - 16 mmol/L    eGFR >60 >60 mL/min/1.73 m^2       RADIOLOGY  X-Ray Chest AP Portable    Result Date: 4/24/2023  EXAMINATION: XR CHEST AP PORTABLE CLINICAL HISTORY: Shortness of breath TECHNIQUE: Single frontal view of the chest was performed. COMPARISON: None FINDINGS: The lungs are clear, with normal appearance of pulmonary vasculature and no pleural effusion or pneumothorax. The cardiac silhouette is normal in size. The hilar and mediastinal  contours are unremarkable. Bones are intact.     No acute abnormality. Electronically signed by: Manuelito Rainey Date:    04/24/2023 Time:    21:00      EKG    MICROBIOLOGY    MDM     Amount and/or Complexity of Data Reviewed  Clinical lab tests: reviewed  Tests in the radiology section of CPT®: reviewed  Tests in the medicine section of CPT®: reviewed  Discussion of test results with the performing providers: yes  Decide to obtain previous medical records or to obtain history from someone other than the patient: yes  Obtain history from someone other than the patient: yes  Review and summarize past medical records: yes  Discuss the patient with other providers: yes  Independent visualization of images, tracings, or specimens: yes
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
## 2023-04-25 NOTE — ED NOTES
Assumed care of pt at this time. PT resting in ED stretcher with family at bedside. Skin warm and dry, rr even and unlabored. BALDOMERO. NADN.
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
